# Patient Record
Sex: MALE | Race: WHITE | Employment: OTHER | URBAN - METROPOLITAN AREA
[De-identification: names, ages, dates, MRNs, and addresses within clinical notes are randomized per-mention and may not be internally consistent; named-entity substitution may affect disease eponyms.]

---

## 2021-07-02 ENCOUNTER — TRANSFERRED RECORDS (OUTPATIENT)
Dept: HEALTH INFORMATION MANAGEMENT | Facility: CLINIC | Age: 76
End: 2021-07-02

## 2021-07-03 ENCOUNTER — TRANSFERRED RECORDS (OUTPATIENT)
Dept: HEALTH INFORMATION MANAGEMENT | Facility: CLINIC | Age: 76
End: 2021-07-03

## 2021-07-03 LAB
ALT SERPL-CCNC: 27 IU/L (ref 0–44)
AST SERPL-CCNC: 27 IU/L (ref 0–40)
CHOLESTEROL (EXTERNAL): 154 MG/DL (ref 100–199)
CREATININE (EXTERNAL): 0.83 MG/DL (ref 0.76–1.27)
GFR ESTIMATED (EXTERNAL): 87 ML/MIN/1.73
GFR ESTIMATED (IF AFRICAN AMERICAN) (EXTERNAL): 100 ML/MIN/1.73
GLUCOSE (EXTERNAL): 108 MG/DL (ref 65–99)
HBA1C MFR BLD: 5.1 % (ref 4.8–5.6)
HDLC SERPL-MCNC: 42 MG/DL
LDL CHOLESTEROL CALCULATED (EXTERNAL): 92 MG/DL (ref 0–99)
POTASSIUM (EXTERNAL): 4.6 MMOL/L (ref 3.5–5.2)
TRIGLYCERIDES (EXTERNAL): 107 MG/DL (ref 0–149)

## 2021-07-06 ENCOUNTER — TRANSFERRED RECORDS (OUTPATIENT)
Dept: HEALTH INFORMATION MANAGEMENT | Facility: CLINIC | Age: 76
End: 2021-07-06

## 2021-07-08 ENCOUNTER — MEDICAL CORRESPONDENCE (OUTPATIENT)
Dept: HEALTH INFORMATION MANAGEMENT | Facility: CLINIC | Age: 76
End: 2021-07-08

## 2021-07-09 DIAGNOSIS — R01.1 HEART MURMUR: Primary | ICD-10-CM

## 2021-08-09 ENCOUNTER — HOSPITAL ENCOUNTER (OUTPATIENT)
Dept: CARDIOLOGY | Facility: CLINIC | Age: 76
Discharge: HOME | End: 2021-08-09
Attending: FAMILY MEDICINE | Admitting: FAMILY MEDICINE
Payer: COMMERCIAL

## 2021-08-09 DIAGNOSIS — R01.1 HEART MURMUR: Primary | ICD-10-CM

## 2021-08-09 LAB — LVEF ECHO: NORMAL

## 2021-08-09 PROCEDURE — 93306 TTE W/DOPPLER COMPLETE: CPT | Mod: 26 | Performed by: INTERNAL MEDICINE

## 2021-08-09 PROCEDURE — 255N000002 HC RX 255 OP 636: Performed by: FAMILY MEDICINE

## 2021-08-09 PROCEDURE — 999N000208 ECHOCARDIOGRAM COMPLETE

## 2021-08-09 RX ADMIN — HUMAN ALBUMIN MICROSPHERES AND PERFLUTREN 3 ML: 10; .22 INJECTION, SOLUTION INTRAVENOUS at 14:42

## 2021-08-18 DIAGNOSIS — I77.810 AORTIC ROOT DILATATION (H): Primary | ICD-10-CM

## 2021-10-05 ENCOUNTER — OFFICE VISIT (OUTPATIENT)
Dept: CARDIOLOGY | Facility: CLINIC | Age: 76
End: 2021-10-05
Payer: COMMERCIAL

## 2021-10-05 VITALS
WEIGHT: 220 LBS | SYSTOLIC BLOOD PRESSURE: 186 MMHG | HEART RATE: 83 BPM | BODY MASS INDEX: 31.5 KG/M2 | DIASTOLIC BLOOD PRESSURE: 86 MMHG | HEIGHT: 70 IN

## 2021-10-05 DIAGNOSIS — R01.1 HEART MURMUR: Primary | ICD-10-CM

## 2021-10-05 DIAGNOSIS — I77.810 AORTIC ROOT DILATATION (H): ICD-10-CM

## 2021-10-05 PROCEDURE — 93000 ELECTROCARDIOGRAM COMPLETE: CPT | Performed by: INTERNAL MEDICINE

## 2021-10-05 PROCEDURE — 99204 OFFICE O/P NEW MOD 45 MIN: CPT | Performed by: INTERNAL MEDICINE

## 2021-10-05 RX ORDER — ASPIRIN 81 MG/1
81 TABLET ORAL DAILY
COMMUNITY
End: 2022-12-19

## 2021-10-05 RX ORDER — METOPROLOL SUCCINATE 50 MG/1
50 TABLET, EXTENDED RELEASE ORAL DAILY
Qty: 30 TABLET | Refills: 11 | Status: SHIPPED | OUTPATIENT
Start: 2021-10-05 | End: 2022-09-09

## 2021-10-05 ASSESSMENT — MIFFLIN-ST. JEOR: SCORE: 1744.16

## 2021-10-05 NOTE — LETTER
10/5/2021    Manny Fajardo MD  [address]  [facility] [address]    RE: Francisco J BERNADETTE Vladimir       Dear Colleague,    I had the pleasure of seeing Francisco J Gilbert in the [facility].    HPI and Plan:       Francisco J Pate is a pleasant 76-year-old gentleman referred by Dr. Manny Gallego for abnormal echocardiogram.  He brings in sheet with his home blood pressures all ranging between 101 20s over 70s and 80s.  However he has whitecoat hypertension today obviously 186/86.  Because of this an echocardiogram was performed showing moderate aortic root and ascending aorta dilated measuring 4.5 and 4.4 cm respectively.  He also has mild aortic insufficiency.    He denies any chest pain chest pressure shortness of breath heart racing palpitations syncope near syncope any orthopnea pedal edema.  We reviewed the images of his echocardiogram.  I also reviewed his EKG which is essentially normal.    He has no family history of sudden cardiac death or aortic aneurysm.  He does not smoke.  He is nondiabetic.  He worked in [employer] for many years and believes this is what has caused stress and whitecoat hypertension.    My recommendations would be to check a lipid profile and consider starting him on statin therapy.  I would also recommend beta-blocker and have written a prescription for Toprol-XL 50 mg daily.  I would obtain a baseline CT scan with contrast to more accurately measure the size of the aorta and this will then thereby dictate frequency of follow-up.    I have measured the aorta myself.  And multiple coaxial cross images saved.  Measuring an average maximal diameter of 47.5cm.   No dissection or thrombosis.  Recommend repeat scan in 1 year.        Orders Placed This Encounter   Procedures     CT Chest Angio w/o & w Contrast     EKG 12-lead complete w/read - Clinics (performed today)     Orders Placed This Encounter   Medications     aspirin 81 MG EC tablet      Sig: Take 81 mg by mouth daily     metoprolol succinate ER (TOPROL-XL) 50 MG 24 hr tablet     Sig: Take 1 tablet (50 mg) by mouth daily     Dispense:  30 tablet     Refill:  11     Medications Discontinued During This Encounter   Medication Reason     ASPIRIN 325 MG OR TBEC Discontinued by another Health Care Provider         Encounter Diagnoses   Name Primary?     Aortic root dilatation (H)      Heart murmur Yes       CURRENT MEDICATIONS:  Current Outpatient Medications   Medication Sig Dispense Refill     aspirin 81 MG EC tablet Take 81 mg by mouth daily       FISH OIL 1000 MG OR CAPS 1 cap QD  0     LISINOPRIL PO Take 20 mg by mouth daily        metoprolol succinate ER (TOPROL-XL) 50 MG 24 hr tablet Take 1 tablet (50 mg) by mouth daily 30 tablet 11     MULTIVITAMINS OR TABS ONE DAILY 100 1 YEAR     FLUTICASONE PROPIONATE (NASAL) 50 MCG/ACT NA SUSP 2 Sprays in eache nostril daily (Patient not taking: Reported on 10/5/2021) 1 Month 12     FOLIC ACID 1 MG OR TABS ONE DAILY (Patient not taking: No sig reported) 3 MONTHS 1 YEAR       ALLERGIES     Allergies   Allergen Reactions     Sulfa Drugs        PAST MEDICAL HISTORY:  Past Medical History:   Diagnosis Date     Hypertension        PAST SURGICAL HISTORY:  Past Surgical History:   Procedure Laterality Date     COLONOSCOPY N/A 12/8/2015    Procedure: COLONOSCOPY;  Surgeon: Miles Banerjee MD;  Location:  GI     TONSILLECTOMY         FAMILY HISTORY:  History reviewed. No pertinent family history.    SOCIAL HISTORY:  Social History     Socioeconomic History     Marital status:      Spouse name: None     Number of children: None     Years of education: None     Highest education level: None   Occupational History     None   Tobacco Use     Smoking status: Never Smoker     Smokeless tobacco: Never Used   Substance and Sexual Activity     Alcohol use: Yes     Comment: bottle /week     Drug use: None     Sexual activity: None   Other Topics Concern      "Parent/sibling w/ CABG, MI or angioplasty before 65F 55M? Not Asked   Social History Narrative     None     Social Determinants of Health     Financial Resource Strain:      Difficulty of Paying Living Expenses:    Food Insecurity:      Worried About Running Out of Food in the Last Year:      Ran Out of Food in the Last Year:    Transportation Needs:      Lack of Transportation (Medical):      Lack of Transportation (Non-Medical):    Physical Activity:      Days of Exercise per Week:      Minutes of Exercise per Session:    Stress:      Feeling of Stress :    Social Connections:      Frequency of Communication with Friends and Family:      Frequency of Social Gatherings with Friends and Family:      Attends Confucianist Services:      Active Member of Clubs or Organizations:      Attends Club or Organization Meetings:      Marital Status:    Intimate Partner Violence:      Fear of Current or Ex-Partner:      Emotionally Abused:      Physically Abused:      Sexually Abused:        Review of Systems:  Skin:  Negative     Eyes:  Negative    ENT:  Negative    Respiratory:  Negative    Cardiovascular:  Negative for;palpitations;chest pain;lightheadedness    Gastroenterology: Negative    Genitourinary:  Negative    Musculoskeletal:  Negative    Neurologic:       Psychiatric:  Negative    Heme/Lymph/Imm:  Negative    Endocrine:  Negative            Physical Exam:  Vitals: BP (!) 186/86   Pulse 83   Ht 1.778 m (5' 10\")   Wt 99.8 kg (220 lb)   BMI 31.57 kg/m    Constitutional: cooperative, alert and oriented, well developed, well nourished, in no acute distress   Skin: warm and dry to the touch, no apparent skin lesions or masses noted   Head: normocephalic, no masses or lesions   Eyes: pupils equal and round, conjunctivae and lids unremarkable, sclera white, no xanthalasma, EOMS intact, no nystagmus   ENT: no pallor or cyanosis, dentition good   Neck: carotid pulses are full and equal bilaterally, JVP normal, no carotid " bruit, no thyromegaly   Chest: normal breath sounds, clear to auscultation, normal A-P diameter, normal symmetry, normal respiratory excursion, no use of accessory muscles   Cardiac: regular rhythm, normal S1/S2, no S3 or S4, apical impulse not displaced, no murmurs, gallops or rubs no presence of murmur   Abdomen: abdomen soft, non-tender, BS normoactive, no mass, no HSM, no bruits   Vascular: pulses full and equal, no bruits auscultated   Extremities and Back: no deformities, clubbing, cyanosis, erythema observed   Neurological: affect appropriate, oriented to time, person and place     Recent Lab Results:  LIPID RESULTS:  Lab Results   Component Value Date    CHOL 167 @ 07/01/2006    HDL 40 @ 07/01/2006    LDL 89 @ 07/01/2006    TRIG 191 @ 07/01/2006    CHOLHDLRATIO 3.9 04/11/2005       LIVER ENZYME RESULTS:  Lab Results   Component Value Date    AST 29 @ 07/01/2006    ALT 30 @ 07/01/2006       CBC RESULTS:  No results found for: WBC, RBC, HGB, HCT, MCV, MCH, MCHC, RDW, PLT    BMP RESULTS:  Lab Results   Component Value Date    POTASSIUM 4.6 12/29/2006     @ 07/01/2006    CR 0.90 12/29/2006    GFRESTIMATED >90 12/29/2006    GFRESTBLACK  12/29/2006     >90  Stages of Chronic Kidney Disease  Stage 1:  GFR 90 or greater and other evidence of kidney damage*  Stage 2:  GFR 60-89 and other evidence of kidney damage *  Stage 3:  GFR 30-59  Stage 4:  GFR 15-29  Stage 5:  GFR less than 15 or dialysis  *Chronic kidney disease is defined as kidney damage or GFR less than 60   mL/min/1.73 m2 for three months or greater.  Kidney damage is defined as   pathologic abnormalities or markers or damage, including abnormalities in blood   or urine tests or imaging studies.        A1C RESULTS:  Lab Results   Component Value Date    A1C 5.0 @ 06/24/2005       INR RESULTS:  No results found for: INR        CC  [facility]  [address]  [place] [address]                      Thank you  for allowing me to participate in the care of your patient.      Sincerely,     VA BLACK MD     [facility]  cc:   [facility]  [address]  [place]  [place] [address]

## 2021-10-05 NOTE — PROGRESS NOTES
HPI and Plan:       Francisco J Pate is a pleasant 76-year-old gentleman referred by Dr. Manny Gallego for abnormal echocardiogram.  He brings in sheet with his home blood pressures all ranging between 101 20s over 70s and 80s.  However he has whitecoat hypertension today obviously 186/86.  Because of this an echocardiogram was performed showing moderate aortic root and ascending aorta dilated measuring 4.5 and 4.4 cm respectively.  He also has mild aortic insufficiency.    He denies any chest pain chest pressure shortness of breath heart racing palpitations syncope near syncope any orthopnea pedal edema.  We reviewed the images of his echocardiogram.  I also reviewed his EKG which is essentially normal.    He has no family history of sudden cardiac death or aortic aneurysm.  He does not smoke.  He is nondiabetic.  He worked in [employer] for many years and believes this is what has caused stress and whitecoat hypertension.    My recommendations would be to check a lipid profile and consider starting him on statin therapy.  I would also recommend beta-blocker and have written a prescription for Toprol-XL 50 mg daily.  I would obtain a baseline CT scan with contrast to more accurately measure the size of the aorta and this will then thereby dictate frequency of follow-up.    I have measured the aorta myself.  And multiple coaxial cross images saved.  Measuring an average maximal diameter of 47.5cm.   No dissection or thrombosis.  Recommend repeat scan in 1 year.        Orders Placed This Encounter   Procedures     CT Chest Angio w/o & w Contrast     EKG 12-lead complete w/read - Clinics (performed today)     Orders Placed This Encounter   Medications     aspirin 81 MG EC tablet     Sig: Take 81 mg by mouth daily     metoprolol succinate ER (TOPROL-XL) 50 MG 24 hr tablet     Sig: Take 1 tablet (50 mg) by mouth daily     Dispense:  30 tablet     Refill:  11     Medications Discontinued During This Encounter   Medication Reason      ASPIRIN 325 MG OR TBEC Discontinued by another Health Care Provider         Encounter Diagnoses   Name Primary?     Aortic root dilatation (H)      Heart murmur Yes       CURRENT MEDICATIONS:  Current Outpatient Medications   Medication Sig Dispense Refill     aspirin 81 MG EC tablet Take 81 mg by mouth daily       FISH OIL 1000 MG OR CAPS 1 cap QD  0     LISINOPRIL PO Take 20 mg by mouth daily        metoprolol succinate ER (TOPROL-XL) 50 MG 24 hr tablet Take 1 tablet (50 mg) by mouth daily 30 tablet 11     MULTIVITAMINS OR TABS ONE DAILY 100 1 YEAR     FLUTICASONE PROPIONATE (NASAL) 50 MCG/ACT NA SUSP 2 Sprays in eache nostril daily (Patient not taking: Reported on 10/5/2021) 1 Month 12     FOLIC ACID 1 MG OR TABS ONE DAILY (Patient not taking: No sig reported) 3 MONTHS 1 YEAR       ALLERGIES     Allergies   Allergen Reactions     Sulfa Drugs        PAST MEDICAL HISTORY:  Past Medical History:   Diagnosis Date     Hypertension        PAST SURGICAL HISTORY:  Past Surgical History:   Procedure Laterality Date     COLONOSCOPY N/A 12/8/2015    Procedure: COLONOSCOPY;  Surgeon: Miles Banerjee MD;  Location:  GI     TONSILLECTOMY         FAMILY HISTORY:  History reviewed. No pertinent family history.    SOCIAL HISTORY:  Social History     Socioeconomic History     Marital status:      Spouse name: None     Number of children: None     Years of education: None     Highest education level: None   Occupational History     None   Tobacco Use     Smoking status: Never Smoker     Smokeless tobacco: Never Used   Substance and Sexual Activity     Alcohol use: Yes     Comment: bottle /week     Drug use: None     Sexual activity: None   Other Topics Concern     Parent/sibling w/ CABG, MI or angioplasty before 65F 55M? Not Asked   Social History Narrative     None     Social Determinants of Health     Financial Resource Strain:      Difficulty of Paying Living Expenses:    Food Insecurity:      Worried About  "Running Out of Food in the Last Year:      Ran Out of Food in the Last Year:    Transportation Needs:      Lack of Transportation (Medical):      Lack of Transportation (Non-Medical):    Physical Activity:      Days of Exercise per Week:      Minutes of Exercise per Session:    Stress:      Feeling of Stress :    Social Connections:      Frequency of Communication with Friends and Family:      Frequency of Social Gatherings with Friends and Family:      Attends Orthodox Services:      Active Member of Clubs or Organizations:      Attends Club or Organization Meetings:      Marital Status:    Intimate Partner Violence:      Fear of Current or Ex-Partner:      Emotionally Abused:      Physically Abused:      Sexually Abused:        Review of Systems:  Skin:  Negative     Eyes:  Negative    ENT:  Negative    Respiratory:  Negative    Cardiovascular:  Negative for;palpitations;chest pain;lightheadedness    Gastroenterology: Negative    Genitourinary:  Negative    Musculoskeletal:  Negative    Neurologic:       Psychiatric:  Negative    Heme/Lymph/Imm:  Negative    Endocrine:  Negative            Physical Exam:  Vitals: BP (!) 186/86   Pulse 83   Ht 1.778 m (5' 10\")   Wt 99.8 kg (220 lb)   BMI 31.57 kg/m    Constitutional: cooperative, alert and oriented, well developed, well nourished, in no acute distress   Skin: warm and dry to the touch, no apparent skin lesions or masses noted   Head: normocephalic, no masses or lesions   Eyes: pupils equal and round, conjunctivae and lids unremarkable, sclera white, no xanthalasma, EOMS intact, no nystagmus   ENT: no pallor or cyanosis, dentition good   Neck: carotid pulses are full and equal bilaterally, JVP normal, no carotid bruit, no thyromegaly   Chest: normal breath sounds, clear to auscultation, normal A-P diameter, normal symmetry, normal respiratory excursion, no use of accessory muscles   Cardiac: regular rhythm, normal S1/S2, no S3 or S4, apical impulse not " displaced, no murmurs, gallops or rubs no presence of murmur   Abdomen: abdomen soft, non-tender, BS normoactive, no mass, no HSM, no bruits   Vascular: pulses full and equal, no bruits auscultated   Extremities and Back: no deformities, clubbing, cyanosis, erythema observed   Neurological: affect appropriate, oriented to time, person and place     Recent Lab Results:  LIPID RESULTS:  Lab Results   Component Value Date    CHOL 167 @ 07/01/2006    HDL 40 @ 07/01/2006    LDL 89 @ 07/01/2006    TRIG 191 @ 07/01/2006    CHOLHDLRATIO 3.9 04/11/2005       LIVER ENZYME RESULTS:  Lab Results   Component Value Date    AST 29 @ 07/01/2006    ALT 30 @ 07/01/2006       CBC RESULTS:  No results found for: WBC, RBC, HGB, HCT, MCV, MCH, MCHC, RDW, PLT    BMP RESULTS:  Lab Results   Component Value Date    POTASSIUM 4.6 12/29/2006     @ 07/01/2006    CR 0.90 12/29/2006    GFRESTIMATED >90 12/29/2006    GFRESTBLACK  12/29/2006     >90  Stages of Chronic Kidney Disease  Stage 1:  GFR 90 or greater and other evidence of kidney damage*  Stage 2:  GFR 60-89 and other evidence of kidney damage *  Stage 3:  GFR 30-59  Stage 4:  GFR 15-29  Stage 5:  GFR less than 15 or dialysis  *Chronic kidney disease is defined as kidney damage or GFR less than 60   mL/min/1.73 m2 for three months or greater.  Kidney damage is defined as   pathologic abnormalities or markers or damage, including abnormalities in blood   or urine tests or imaging studies.        A1C RESULTS:  Lab Results   Component Value Date    A1C 5.0 @ 06/24/2005       INR RESULTS:  No results found for: INR        CC  [facility]  [address]  [place] [address]

## 2021-10-21 ENCOUNTER — HOSPITAL ENCOUNTER (OUTPATIENT)
Dept: CARDIOLOGY | Facility: CLINIC | Age: 76
Discharge: HOME | End: 2021-10-21
Attending: INTERNAL MEDICINE | Admitting: INTERNAL MEDICINE
Payer: COMMERCIAL

## 2021-10-21 DIAGNOSIS — I77.810 AORTIC ROOT DILATATION (H): ICD-10-CM

## 2021-10-21 DIAGNOSIS — R01.1 HEART MURMUR: ICD-10-CM

## 2021-10-21 LAB
CREAT BLD-MCNC: 0.9 MG/DL (ref 0.7–1.3)
GFR SERPL CREATININE-BSD FRML MDRD: >60 ML/MIN/1.73M2

## 2021-10-21 PROCEDURE — 82565 ASSAY OF CREATININE: CPT

## 2021-10-21 PROCEDURE — 250N000011 HC RX IP 250 OP 636: Performed by: INTERNAL MEDICINE

## 2021-10-21 PROCEDURE — 71275 CT ANGIOGRAPHY CHEST: CPT

## 2021-10-21 RX ORDER — METHYLPREDNISOLONE SODIUM SUCCINATE 125 MG/2ML
125 INJECTION, POWDER, LYOPHILIZED, FOR SOLUTION INTRAMUSCULAR; INTRAVENOUS
Status: DISCONTINUED | OUTPATIENT
Start: 2021-10-21 | End: 2021-10-22 | Stop reason: HOSPADM

## 2021-10-21 RX ORDER — IOPAMIDOL 755 MG/ML
500 INJECTION, SOLUTION INTRAVASCULAR ONCE
Status: COMPLETED | OUTPATIENT
Start: 2021-10-21 | End: 2021-10-21

## 2021-10-21 RX ORDER — DIPHENHYDRAMINE HYDROCHLORIDE 50 MG/ML
25-50 INJECTION INTRAMUSCULAR; INTRAVENOUS
Status: DISCONTINUED | OUTPATIENT
Start: 2021-10-21 | End: 2021-10-22 | Stop reason: HOSPADM

## 2021-10-21 RX ORDER — ONDANSETRON 2 MG/ML
4 INJECTION INTRAMUSCULAR; INTRAVENOUS
Status: DISCONTINUED | OUTPATIENT
Start: 2021-10-21 | End: 2021-10-22 | Stop reason: HOSPADM

## 2021-10-21 RX ORDER — DIPHENHYDRAMINE HCL 25 MG
25 CAPSULE ORAL
Status: DISCONTINUED | OUTPATIENT
Start: 2021-10-21 | End: 2021-10-22 | Stop reason: HOSPADM

## 2021-10-21 RX ORDER — ACYCLOVIR 200 MG/1
0-1 CAPSULE ORAL
Status: DISCONTINUED | OUTPATIENT
Start: 2021-10-21 | End: 2021-10-22 | Stop reason: HOSPADM

## 2021-10-21 RX ADMIN — IOPAMIDOL 100 ML: 755 INJECTION, SOLUTION INTRAVENOUS at 14:00

## 2021-11-19 ENCOUNTER — TELEPHONE (OUTPATIENT)
Dept: CARDIOLOGY | Facility: CLINIC | Age: 76
End: 2021-11-19
Payer: COMMERCIAL

## 2021-11-19 DIAGNOSIS — I77.810 AORTIC ROOT DILATATION (H): Primary | ICD-10-CM

## 2021-11-19 NOTE — TELEPHONE ENCOUNTER
Dr. Travis re - measured CT scan done 10/21/21.  He got an average measurement of 47.5 mm.  He would like a repeat CT scan in 1 year.  I called patient with this information.  I left a VM.

## 2022-07-27 ENCOUNTER — TRANSFERRED RECORDS (OUTPATIENT)
Dept: HEALTH INFORMATION MANAGEMENT | Facility: CLINIC | Age: 77
End: 2022-07-27

## 2022-08-10 ENCOUNTER — CARE COORDINATION (OUTPATIENT)
Dept: CARDIOLOGY | Facility: CLINIC | Age: 77
End: 2022-08-10

## 2022-08-10 NOTE — PROGRESS NOTES
Received CTA chest results from 10/2021 from [facility]. A note attached states that pt will see  in September as a new pt. Pt has some medical anxiety and is open to medication if the benefits are made clear to him. Pt likes concrete goals. Will add to chart prep note for 9/9/22. CTA from 10/2021 has already been scanned into [employer]. Govind ALMEIDA August 10, 2022, 2:58 PM

## 2022-09-09 ENCOUNTER — OFFICE VISIT (OUTPATIENT)
Dept: CARDIOLOGY | Facility: CLINIC | Age: 77
End: 2022-09-09
Payer: COMMERCIAL

## 2022-09-09 VITALS
HEART RATE: 64 BPM | WEIGHT: 217.9 LBS | BODY MASS INDEX: 31.2 KG/M2 | SYSTOLIC BLOOD PRESSURE: 123 MMHG | OXYGEN SATURATION: 97 % | DIASTOLIC BLOOD PRESSURE: 64 MMHG | HEIGHT: 70 IN

## 2022-09-09 DIAGNOSIS — I77.810 AORTIC ROOT DILATATION (H): ICD-10-CM

## 2022-09-09 DIAGNOSIS — I10 ESSENTIAL HYPERTENSION, BENIGN: Primary | ICD-10-CM

## 2022-09-09 DIAGNOSIS — I35.1 NONRHEUMATIC AORTIC VALVE INSUFFICIENCY: ICD-10-CM

## 2022-09-09 DIAGNOSIS — R01.1 HEART MURMUR: ICD-10-CM

## 2022-09-09 PROCEDURE — 99214 OFFICE O/P EST MOD 30 MIN: CPT | Performed by: INTERNAL MEDICINE

## 2022-09-09 RX ORDER — METOPROLOL SUCCINATE 50 MG/1
50 TABLET, EXTENDED RELEASE ORAL DAILY
Qty: 90 TABLET | Refills: 3 | Status: SHIPPED | OUTPATIENT
Start: 2022-09-09 | End: 2023-10-13

## 2022-09-09 RX ORDER — LISINOPRIL AND HYDROCHLOROTHIAZIDE 20; 25 MG/1; MG/1
1 TABLET ORAL DAILY
Qty: 90 TABLET | Refills: 3 | Status: SHIPPED | OUTPATIENT
Start: 2022-09-09 | End: 2022-09-26

## 2022-09-09 NOTE — PROGRESS NOTES
Service Date: 09/09/2022    CARDIOLOGY OFFICE PROGRESS NOTE    PRIMARY CARE PHYSICIAN:  Seymour Reardon, OTR/L       HISTORY OF PRESENT ILLNESS:  I had the opportunity to see Mr. Francisco J Gilbert in Cardiology Clinic today at [facility] in [place] for evaluation of an ascending aortic aneurysm.  Mr. Gilbert is a 77-year-old gentleman who was found to have a cardiac murmur a year ago and referred for an echocardiogram.  That echocardiogram revealed mild aortic valve sclerosis of a trileaflet valve with mild aortic regurgitation and no significant valvular stenosis.  He was also noted to have a dilated ascending aorta measured at 4.4 cm on the echocardiogram with moderate root dilation measured at 4.5 cm.  A followup CT scan of the ascending aorta was performed on 10/21/2021 demonstrating a maximum dimension of the aortic root of 4.7 cm and a maximum dimension of the ascending aorta at 4.9 cm.  He was started on metoprolol XL 50 mg a day in addition to his lisinopril/hydrochlorothiazide 20/25 mg p.o. daily and has done well with this.  He has had no concerning cardiac symptoms.  He walks more than 20 miles a week and has no concerning cardiac symptoms, such as chest discomfort, shortness of breath, lightheadedness, palpitations or syncope.    He does have hypertension, obviously, but his blood pressures at home have been extremely well controlled.  Typically, his blood pressures are in the 1-teens to low 120s systolic.  He never has blood pressures above 140 at home.  However, in the office, his blood pressures are more consistently elevated, sometimes up into the 160s and 180s.  His heart rates are generally in the 50s.  He does not have lightheadedness or syncope.    PHYSICAL EXAMINATION:  On examination here today, his blood pressure is 123/64, heart rate 64 and weight 217 pounds.  His blood pressure was 165/86 before coming down.  His lungs are clear.  His heart rhythm is regular.  He has a soft  systolic ejection murmur at the base.    IMPRESSIONS:  Mr. Francisco J Gilbert is a 77-year-old gentleman with mildly sclerotic trileaflet aortic valve with mild aortic regurgitation.  He also has moderate dilation of the aortic root and ascending aorta.  I reassured him that this does not require surgical treatment and that we can continue monitoring his ascending aorta at this time. We may consider repair of the ascending aorta if it reaches 5.5-6 cm.    His blood pressure control appears optimal.  I will not change any medications.  I will have him repeat an echocardiogram this year to recheck his aortic root and ascending aorta and consider a CT angiogram if his aorta appears larger in size.    cc:  Seymour Reardon, OTR/L  [facility] [address] [place] [address]    Shashank Wilson MD, [facility]        D: 2022   T: 2022   MT: [email or web address]    Name:     FRANCISCO J GILBERT  MRN:      [id number]        Account:      [id number]   :      1945           Service Date: 2022       Document: [id number]

## 2022-09-09 NOTE — PROGRESS NOTES
HPI and Plan:   See dictation    Today's clinic visit entailed:  Review of the result(s) of each unique test - echo, CTA aorta, flp  Ordering of each unique test  Prescription drug management  40 minutes spent on the date of the encounter doing chart review, review of outside records, review of test results, patient visit and documentation   Provider  Link to [facility] Help Grid     The level of medical decision making during this visit was of high complexity.      Orders Placed This Encounter   Procedures     Follow-Up with Cardiology     Echocardiogram Complete     Echocardiogram Complete       Orders Placed This Encounter   Medications     metoprolol succinate ER (TOPROL XL) 50 MG 24 hr tablet     Sig: Take 1 tablet (50 mg) by mouth daily     Dispense:  90 tablet     Refill:  3     lisinopril-hydrochlorothiazide (ZESTORETIC) 20-25 MG tablet     Sig: Take 1 tablet by mouth daily     Dispense:  90 tablet     Refill:  3       Medications Discontinued During This Encounter   Medication Reason     LISINOPRIL PO      metoprolol succinate ER (TOPROL-XL) 50 MG 24 hr tablet Reorder     FOLIC ACID 1 MG OR TABS          Encounter Diagnoses   Name Primary?     Aortic root dilatation (H)      Heart murmur      HYPERTENSION BENIGN Yes     Nonrheumatic aortic valve insufficiency        CURRENT MEDICATIONS:  Current Outpatient Medications   Medication Sig Dispense Refill     aspirin 81 MG EC tablet Take 81 mg by mouth daily       FISH OIL 1000 MG OR CAPS 1 cap QD  0     FLUTICASONE PROPIONATE (NASAL) 50 MCG/ACT NA SUSP 2 Sprays in eache nostril daily (Patient taking differently: As needed) 1 Month 12     lisinopril-hydrochlorothiazide (ZESTORETIC) 20-25 MG tablet Take 1 tablet by mouth daily 90 tablet 3     metoprolol succinate ER (TOPROL XL) 50 MG 24 hr tablet Take 1 tablet (50 mg) by mouth daily 90 tablet 3     MULTIVITAMINS OR TABS ONE DAILY 100 1 YEAR       ALLERGIES     Allergies   Allergen Reactions     Sulfa Drugs       "Seasonal Allergies        PAST MEDICAL HISTORY:  Past Medical History:   Diagnosis Date     Hypertension        PAST SURGICAL HISTORY:  Past Surgical History:   Procedure Laterality Date     COLONOSCOPY N/A 12/8/2015    Procedure: COLONOSCOPY;  Surgeon: Miles Banerjee MD;  Location:  GI     TONSILLECTOMY         FAMILY HISTORY:  No family history on file.    SOCIAL HISTORY:  Social History     Socioeconomic History     Marital status:      Spouse name: None     Number of children: None     Years of education: None     Highest education level: None   Tobacco Use     Smoking status: Never Smoker     Smokeless tobacco: Never Used   Substance and Sexual Activity     Alcohol use: Yes     Comment: wine every day     Drug use: Never       Review of Systems:  Skin:  Negative       Eyes:  Negative      ENT:  Negative      Respiratory:  Negative shortness of breath;dyspnea on exertion     Cardiovascular:  Negative;palpitations;chest pain;edema;fatigue;dizziness;lightheadedness      Gastroenterology: Negative      Genitourinary:  Negative      Musculoskeletal:  Negative      Neurologic:  Negative      Psychiatric:  Negative      Heme/Lymph/Imm:  Positive for allergies    Endocrine:  Negative        Physical Exam:  Vitals: /64   Pulse 64   Ht 1.778 m (5' 10\")   Wt 98.8 kg (217 lb 14.4 oz)   SpO2 97%   BMI 31.27 kg/m      Constitutional:           Skin:             Head:           Eyes:           Lymph:      ENT:           Neck:           Respiratory:            Cardiac:                                                           GI:           Extremities and Muscular Skeletal:                 Neurological:           Psych:           CC  Referred Self, MD  No address on file              "

## 2022-09-09 NOTE — PATIENT INSTRUCTIONS
It was a pleasure seeing you today and thank you for allowing me to be a part of your health care team.  Should you have any questions regarding your visit or future needs please feel free to reach out to my care team for assistance.      Thank you, Dr. Shashank Wilson        **Nursing: [phone]       **Scheduling: [phone]    
Pt is refusing to Exam B/L Lower EXT,Pt states DO NOT Touch My Foot & Dressings AT ALL  D/W Pt at detail, pt refusing dressing to be opened.

## 2022-09-09 NOTE — LETTER
9/9/2022    Seymour Reardon MD  [address]  [facility] [address]    RE: Francisco J Gilbert       Dear Colleague,     I had the pleasure of seeing Francisco J Gilbert in the [facility].  HPI and Plan:   See dictation    Today's clinic visit entailed:  Review of the result(s) of each unique test - echo, CTA aorta, flp  Ordering of each unique test  Prescription drug management  40 minutes spent on the date of the encounter doing chart review, review of outside records, review of test results, patient visit and documentation   Provider  Link to MDM Help Grid     The level of medical decision making during this visit was of high complexity.      Orders Placed This Encounter   Procedures     Follow-Up with Cardiology     Echocardiogram Complete     Echocardiogram Complete       Orders Placed This Encounter   Medications     metoprolol succinate ER (TOPROL XL) 50 MG 24 hr tablet     Sig: Take 1 tablet (50 mg) by mouth daily     Dispense:  90 tablet     Refill:  3     lisinopril-hydrochlorothiazide (ZESTORETIC) 20-25 MG tablet     Sig: Take 1 tablet by mouth daily     Dispense:  90 tablet     Refill:  3       Medications Discontinued During This Encounter   Medication Reason     LISINOPRIL PO      metoprolol succinate ER (TOPROL-XL) 50 MG 24 hr tablet Reorder     FOLIC ACID 1 MG OR TABS          Encounter Diagnoses   Name Primary?     Aortic root dilatation (H)      Heart murmur      HYPERTENSION BENIGN Yes     Nonrheumatic aortic valve insufficiency        CURRENT MEDICATIONS:  Current Outpatient Medications   Medication Sig Dispense Refill     aspirin 81 MG EC tablet Take 81 mg by mouth daily       FISH OIL 1000 MG OR CAPS 1 cap QD  0     FLUTICASONE PROPIONATE (NASAL) 50 MCG/ACT NA SUSP 2 Sprays in eache nostril daily (Patient taking differently: As needed) 1 Month 12     lisinopril-hydrochlorothiazide (ZESTORETIC) 20-25 MG tablet Take 1 tablet by mouth daily 90 tablet 3     metoprolol  "succinate ER (TOPROL XL) 50 MG 24 hr tablet Take 1 tablet (50 mg) by mouth daily 90 tablet 3     MULTIVITAMINS OR TABS ONE DAILY 100 1 YEAR       ALLERGIES     Allergies   Allergen Reactions     Sulfa Drugs      Seasonal Allergies        PAST MEDICAL HISTORY:  Past Medical History:   Diagnosis Date     Hypertension        PAST SURGICAL HISTORY:  Past Surgical History:   Procedure Laterality Date     COLONOSCOPY N/A 12/8/2015    Procedure: COLONOSCOPY;  Surgeon: Miles Banerjee MD;  Location:  GI     TONSILLECTOMY         FAMILY HISTORY:  No family history on file.    SOCIAL HISTORY:  Social History     Socioeconomic History     Marital status:      Spouse name: None     Number of children: None     Years of education: None     Highest education level: None   Tobacco Use     Smoking status: Never Smoker     Smokeless tobacco: Never Used   Substance and Sexual Activity     Alcohol use: Yes     Comment: wine every day     Drug use: Never       Review of Systems:  Skin:  Negative       Eyes:  Negative      ENT:  Negative      Respiratory:  Negative shortness of breath;dyspnea on exertion     Cardiovascular:  Negative;palpitations;chest pain;edema;fatigue;dizziness;lightheadedness      Gastroenterology: Negative      Genitourinary:  Negative      Musculoskeletal:  Negative      Neurologic:  Negative      Psychiatric:  Negative      Heme/Lymph/Imm:  Positive for allergies    Endocrine:  Negative        Physical Exam:  Vitals: /64   Pulse 64   Ht 1.778 m (5' 10\")   Wt 98.8 kg (217 lb 14.4 oz)   SpO2 97%   BMI 31.27 kg/m      Constitutional:           Skin:             Head:           Eyes:           Lymph:      ENT:           Neck:           Respiratory:            Cardiac:                                                           GI:           Extremities and Muscular Skeletal:                 Neurological:           Psych:           CC  Referred Self    Thank you for allowing me to participate " in the care of your patient.      Sincerely,     ASHLEIGH LANDRY MD     [facility]

## 2022-09-12 ENCOUNTER — TELEPHONE (OUTPATIENT)
Dept: CARDIOLOGY | Facility: CLINIC | Age: 77
End: 2022-09-12

## 2022-09-12 DIAGNOSIS — R01.1 HEART MURMUR: ICD-10-CM

## 2022-09-12 DIAGNOSIS — I77.810 AORTIC ROOT DILATATION (H): ICD-10-CM

## 2022-09-12 DIAGNOSIS — I10 ESSENTIAL HYPERTENSION, BENIGN: Primary | ICD-10-CM

## 2022-09-12 NOTE — TELEPHONE ENCOUNTER
I called pt and discussed his concerns. Pt said he already takes lisinopril and metoprolol.  changed his lisinopril to a combo med of lisinopril/HCTZ. Pt has a sulfa allergy and wants to make sure its ok to take the hydrochlorothiazide. I will message  and call pt back with an update. Govind ALMEIDA September 12, 2022, 3:26 PM

## 2022-09-12 NOTE — TELEPHONE ENCOUNTER
[facility] Call Center    Phone Message    May a detailed message be left on voicemail: yes     Reason for Call: Other: PtFrancisco J has a Sulfa Allergy and concerned with the prescriptions: lisinopril-hydrochlorothiazide (ZESTORETIC) 20-25 MG tablet & metoprolol succinate ER (TOPROL XL) 50 MG 24 hr tablet.  Please call Francisco J at: [phone]     Action Taken: Other: [facility]    Travel Screening: Not Applicable

## 2022-09-22 NOTE — TELEPHONE ENCOUNTER
We should make sure that he did not have anaphylaxis with sulfa, otherwise the risk of a severe allergic reaction with hydrochlorothiazide is low and he can start it and let us know if he has problems. EE

## 2022-09-23 NOTE — TELEPHONE ENCOUNTER
I called pt to verify whether he had an anaphylactic reaction to sulfa in the past. Pt said he was 9 or 10 years old so he doesn't remember the event clearly. He remembers breaking out in hives, and he has had sulfa documented as an allergy since that time. He can't remember if he had throat tightening or shortness of breath. Pt said he has been taking lisinopril 20 mg daily and metoprolol succinate 50 mg daily and his BP has ranged from 107//69, HR 50s since taking both of those medications. I will route an update to . I told pt to continue to monitor his BPs at home and call if SBP is consistently in the 130s. Govind ALMEIDA September 23, 2022, 11:32 AM

## 2022-09-23 NOTE — TELEPHONE ENCOUNTER
Maybe he doesn't need the HcTZ, since his BP now seems well controlled. Ok to continue lisinopril and metoprolol and we will hold off on hydrochlorothiazide for now and continue monitoring his BP. EE

## 2022-09-26 RX ORDER — LISINOPRIL 20 MG/1
20 TABLET ORAL DAILY
COMMUNITY
Start: 2022-09-26 | End: 2022-09-28

## 2022-09-26 NOTE — TELEPHONE ENCOUNTER
I left message for pt to call back to discuss 's recommendations. Pt to continue taking lisinopril 20 mg daily and metoprolol succinate 50 mg daily. He doesn't need to take the hydrochlorothiazide/lisinopril combination. Pt to continue monitoring home BP and to call if it's consistently elevated. Medication list updated. Will see if pt needs refills when he calls back. He already has a lisinopril rx from his PCP. Govind ALMEIDA September 26, 2022, 10:30 AM

## 2022-09-28 RX ORDER — LISINOPRIL 20 MG/1
20 TABLET ORAL DAILY
Qty: 90 TABLET | Refills: 3 | Status: SHIPPED | OUTPATIENT
Start: 2022-09-28 | End: 2023-06-02

## 2022-09-28 NOTE — TELEPHONE ENCOUNTER
I called pt with recommendation from  to discontinue the hydrochlorothiazide and continue taking lisinopril 20 mg daily and metoprolol succinate 50 mg daily. Pt to call if BP trends up at home. Updated prescription sent to his pharmacy. Govind ALMEIDA September 28, 2022, 11:28 AM

## 2022-10-07 ENCOUNTER — CARE COORDINATION (OUTPATIENT)
Dept: CARDIOLOGY | Facility: CLINIC | Age: 77
End: 2022-10-07

## 2022-10-07 ENCOUNTER — HOSPITAL ENCOUNTER (OUTPATIENT)
Dept: CARDIOLOGY | Facility: CLINIC | Age: 77
Discharge: HOME | End: 2022-10-07
Attending: INTERNAL MEDICINE | Admitting: INTERNAL MEDICINE
Payer: COMMERCIAL

## 2022-10-07 DIAGNOSIS — I77.810 AORTIC ROOT DILATATION (H): ICD-10-CM

## 2022-10-07 LAB — LVEF ECHO: NORMAL

## 2022-10-07 PROCEDURE — 93306 TTE W/DOPPLER COMPLETE: CPT

## 2022-10-07 PROCEDURE — 93306 TTE W/DOPPLER COMPLETE: CPT | Mod: 26 | Performed by: INTERNAL MEDICINE

## 2022-10-07 NOTE — PROGRESS NOTES
Kevin Armstrong, ELLE   10/7/2022  4:07 PM CDT Back to Top        Echo ordered post Dr Landry visit 2022 - plan: I will have him repeat an echocardiogram this year to recheck his aortic root and ascending aorta and consider a CT angiogram if his aorta appears larger in size.  Forwarded results to DR Landry and his nurse team.   [id number]  [id number]  [id number]  [id number]^FRANTZ^ASHLEIGH^MALINDA     [facility]  [facility]  Echocardiography Laboratory  [address]  [address]  [place] [address]  Phone [phone]  Fax [phone]     Name: YOEL AN  MRN: [id number]  : 1945  Study Date: 10/07/2022 01:58 PM  Age: 77 yrs  Gender: Male  Patient Location: [facility]  Reason For Study: Aortic root dilatation (H)  Ordering Physician: ASHLEIGH LANDRY  Referring Physician: Seymour Reardon  Performed By: Amarilis Wong     BSA: 2.2 m2  Height: 70 in  Weight: 217 lb  HR: 60  BP: 176/77 mmHg  ______________________________________________________________________________  Procedure  Complete Echo Adult.     Interpretation Summary     1. Left ventricular systolic function is normal. The visual ejection fraction  is 60-65%.  2. No regional wall motion abnormalities noted.  3. The right ventricle is normal in structure, function and size.  4. There is mild to moderate (1-2+) aortic regurgitation.  5. Moderately dilated aortic root (sinus of valsalva) 4.5 cm and moderately  dilated ascending aorta, 4.4 cm.  6. In comparison with the prior study, there has been no significant change.  ______________________________________________________________________________  Left Ventricle  The left ventricle is normal in size. There is normal left ventricular wall  thickness. Left ventricular systolic function is normal. The visual ejection  fraction is 60-65%. Diastolic Doppler findings (E/E' ratio and/or other  parameters) suggest left ventricular filling pressures are indeterminate.  No  regional wall motion abnormalities noted.     Right Ventricle  The right ventricle is normal in structure, function and size.     Atria  Normal left atrial size. Right atrial size is normal. There is no color  Doppler evidence of an atrial shunt.     Mitral Valve  The mitral valve is normal in structure and function.     Tricuspid Valve  The tricuspid valve is normal in structure and function. There is trace  tricuspid regurgitation.     Aortic Valve  The aortic valve is trileaflet with aortic valve sclerosis. There is mild to  moderate (1-2+) aortic regurgitation.     Pulmonic Valve  The pulmonic valve is not well seen, but is grossly normal.     Vessels  Moderate aortic root dilatation. The ascending aorta is Mildly dilated. IVC  diameter <2.1 cm collapsing >50% with sniff suggests a normal RA pressure of 3  mmHg.     Pericardium  There is no pericardial effusion.     Rhythm  Sinus rhythm was noted.     ______________________________________________________________________________  MMode/2D Measurements & Calculations  IVSd: 1.3 cm  LVIDd: 4.9 cm  LVIDs: 2.3 cm  LVPWd: 0.99 cm  FS: 53.1 %  LV mass(C)d: 215.4 grams  LV mass(C)dI: 99.7 grams/m2  Ao root diam: 4.5 cm  LA dimension: 3.1 cm     asc Aorta Diam: 4.4 cm  LA/Ao: 0.70  LVOT diam: 2.7 cm  LVOT area: 5.8 cm2  LA Volume (BP): 56.4 ml  LA Volume Index (BP): 26.1 ml/m2  RWT: 0.41     Doppler Measurements & Calculations  MV E max alecia: 102.0 cm/sec  MV A max alecia: 75.7 cm/sec  MV E/A: 1.3  MV dec slope: 394.4 cm/sec2  AI P1/2t: 422.3 msec     LV V1 max P.4 mmHg  LV V1 max: 135.9 cm/sec  LV V1 VTI: 32.7 cm  SV(LVOT): 188.4 ml  SI(LVOT): 87.2 ml/m2  PA acc time: 0.17 sec  E/E' av.1  Lateral E/e': 9.8  Medial E/e': 14.4     ______________________________________________________________________________  Report approved by: Salena Zamudio 10/07/2022 03:08 PM

## 2022-10-10 NOTE — PROGRESS NOTES
Echo was sent to  to review. It looks like  placed a CT chest angio order at the 10/5/21 visit, and scheduling mailed pt a letter with a request to call to schedule it. Will clarify with  whether this CT scan is still needed. Govind ALMEIDA October 10, 2022, 10:46 AM

## 2022-10-24 NOTE — TELEPHONE ENCOUNTER
There is no need for him to do a CT angiogram of the aorta at this time.  The echocardiogram looks stable.  The ascending aorta is mild to moderately dilated and we can evaluate this again next year by echocardiogram and reconsider CT angio if we see any significant change in the size of the aorta by echo.  Shashank Wilson MD

## 2022-10-25 NOTE — PROGRESS NOTES
I left message for pt to call back to review echo results. CT orders canceled. Govind ALMEIDA October 25, 2022, 12:01 PM

## 2022-11-03 NOTE — PROGRESS NOTES
Pt left message returning my call. I left message for pt to call back. Govind ALMEIDA November 3, 2022, 1:55 PM

## 2022-11-03 NOTE — PROGRESS NOTES
Pt called back and I reviewed echo result with him. Pt to have another echo and follow up in 9/2023. I let him know he does not need the CT scan at this time, but he may get a letter telling him he needs to schedule it. Pt to ignore letter if he does as it was based on an old order. Pt requested a copy of his echo report be mailed out to him. Govind ALMEIDA November 3, 2022, 2:00 PM

## 2022-12-16 ENCOUNTER — CARE COORDINATION (OUTPATIENT)
Dept: CARDIOLOGY | Facility: CLINIC | Age: 77
End: 2022-12-16

## 2022-12-16 NOTE — PROGRESS NOTES
Pt left message stating that he is having a reaction with one of his BP medications. I left message for pt to call back to discuss. Govind ALMEIDA December 16, 2022, 11:44 AM

## 2022-12-16 NOTE — PROGRESS NOTES
Pt called back. He said he is experiencing intermittent nose bleeds, increased fatigue, and cold hands/feet. He researched metoprolol and thinks some of the side effects are from that. I told pt the nosebleeds are likely not from metoprolol, and if they continue he should go to an ENT. Pt said he wants to know if  would be ok with him stopping aspirin. Pt said fatigue is the most noticeable side effect from metoprolol. I recommended he try taking it at night to see if that helps. Pt said his BP has been great at home and has been 110-115/55-65. Govind ALMEIDA December 16, 2022, 3:35 PM

## 2022-12-19 NOTE — PROGRESS NOTES
Steve, hSashank Weller MD  You 3 days ago     EE  Metoprolol is an important medication to keep his aorta from enlarging further. If he can take it at night and improve the fatigue issue, I would encourage that. He probably does not need the aspirin. He has no history of artery blockages. He can stop it. EE      I left message for pt with recommendations from . Pt to call back with any questions or if he has continued side effects with metoprolol after trying to take it at night. Ok to stop aspirin. Govind AMLEIDA December 19, 2022, 10:22 AM

## 2023-05-17 ENCOUNTER — APPOINTMENT (OUTPATIENT)
Dept: URBAN - METROPOLITAN AREA CLINIC 256 | Age: 78
Setting detail: DERMATOLOGY
End: 2023-05-17

## 2023-05-17 VITALS — WEIGHT: 209 LBS | HEIGHT: 70 IN

## 2023-05-17 DIAGNOSIS — D18.0 HEMANGIOMA: ICD-10-CM

## 2023-05-17 DIAGNOSIS — L72.0 EPIDERMAL CYST: ICD-10-CM

## 2023-05-17 DIAGNOSIS — Z71.89 OTHER SPECIFIED COUNSELING: ICD-10-CM

## 2023-05-17 DIAGNOSIS — L82.1 OTHER SEBORRHEIC KERATOSIS: ICD-10-CM

## 2023-05-17 DIAGNOSIS — D22 MELANOCYTIC NEVI: ICD-10-CM

## 2023-05-17 DIAGNOSIS — L57.8 OTHER SKIN CHANGES DUE TO CHRONIC EXPOSURE TO NONIONIZING RADIATION: ICD-10-CM

## 2023-05-17 DIAGNOSIS — L57.0 ACTINIC KERATOSIS: ICD-10-CM

## 2023-05-17 PROBLEM — D18.01 HEMANGIOMA OF SKIN AND SUBCUTANEOUS TISSUE: Status: ACTIVE | Noted: 2023-05-17

## 2023-05-17 PROBLEM — D22.5 MELANOCYTIC NEVI OF TRUNK: Status: ACTIVE | Noted: 2023-05-17

## 2023-05-17 PROCEDURE — OTHER LIQUID NITROGEN: OTHER

## 2023-05-17 PROCEDURE — OTHER COUNSELING: OTHER

## 2023-05-17 PROCEDURE — 17000 DESTRUCT PREMALG LESION: CPT

## 2023-05-17 PROCEDURE — 17003 DESTRUCT PREMALG LES 2-14: CPT

## 2023-05-17 PROCEDURE — 99213 OFFICE O/P EST LOW 20 MIN: CPT | Mod: 25

## 2023-05-17 PROCEDURE — OTHER MIPS QUALITY: OTHER

## 2023-05-17 ASSESSMENT — LOCATION SIMPLE DESCRIPTION DERM
LOCATION SIMPLE: LEFT FOREARM
LOCATION SIMPLE: RIGHT FOREHEAD
LOCATION SIMPLE: RIGHT POSTERIOR THIGH
LOCATION SIMPLE: RIGHT FOREARM
LOCATION SIMPLE: LEFT CHEEK
LOCATION SIMPLE: SCALP
LOCATION SIMPLE: LEFT UPPER BACK
LOCATION SIMPLE: LEFT POSTERIOR THIGH

## 2023-05-17 ASSESSMENT — LOCATION DETAILED DESCRIPTION DERM
LOCATION DETAILED: LEFT PROXIMAL DORSAL FOREARM
LOCATION DETAILED: RIGHT SUPERIOR FOREHEAD
LOCATION DETAILED: RIGHT CENTRAL PARIETAL SCALP
LOCATION DETAILED: LEFT SUPERIOR UPPER BACK
LOCATION DETAILED: LEFT MEDIAL UPPER BACK
LOCATION DETAILED: RIGHT DISTAL POSTERIOR THIGH
LOCATION DETAILED: RIGHT SUPERIOR PARIETAL SCALP
LOCATION DETAILED: RIGHT SUPERIOR MEDIAL FOREHEAD
LOCATION DETAILED: LEFT INFERIOR CENTRAL MALAR CHEEK
LOCATION DETAILED: RIGHT DISTAL DORSAL FOREARM
LOCATION DETAILED: LEFT INFERIOR UPPER BACK
LOCATION DETAILED: LEFT DISTAL POSTERIOR THIGH
LOCATION DETAILED: RIGHT INFERIOR LATERAL FOREHEAD

## 2023-05-17 ASSESSMENT — LOCATION ZONE DERM
LOCATION ZONE: SCALP
LOCATION ZONE: LEG
LOCATION ZONE: ARM
LOCATION ZONE: FACE
LOCATION ZONE: TRUNK

## 2023-05-17 NOTE — PROCEDURE: LIQUID NITROGEN
Show Applicator Variable?: Yes
Render Post-Care Instructions In Note?: no
Post-Care Instructions: I reviewed with the patient in detail post-care instructions. Patient is to wear sunprotection, and avoid picking at any of the treated lesions. Pt may apply Vaseline to crusted or scabbing areas.
Consent: The patient's consent was obtained including but not limited to risks of crusting, scabbing, blistering, scarring, darker or lighter pigmentary change, recurrence, incomplete removal and infection.
Duration Of Freeze Thaw-Cycle (Seconds): 5
Application Tool (Optional): Liquid Nitrogen Sprayer
Number Of Freeze-Thaw Cycles: 1 freeze-thaw cycle
Detail Level: Detailed

## 2023-06-02 DIAGNOSIS — I10 ESSENTIAL HYPERTENSION, BENIGN: ICD-10-CM

## 2023-06-02 RX ORDER — LISINOPRIL 20 MG/1
20 TABLET ORAL DAILY
Qty: 90 TABLET | Refills: 0 | Status: SHIPPED | OUTPATIENT
Start: 2023-06-02 | End: 2023-11-01

## 2023-08-24 ENCOUNTER — TRANSFERRED RECORDS (OUTPATIENT)
Dept: HEALTH INFORMATION MANAGEMENT | Facility: CLINIC | Age: 78
End: 2023-08-24

## 2023-08-29 ENCOUNTER — TRANSFERRED RECORDS (OUTPATIENT)
Dept: HEALTH INFORMATION MANAGEMENT | Facility: CLINIC | Age: 78
End: 2023-08-29

## 2023-08-29 ENCOUNTER — CARE COORDINATION (OUTPATIENT)
Dept: CARDIOLOGY | Facility: CLINIC | Age: 78
End: 2023-08-29

## 2023-08-29 ENCOUNTER — HOSPITAL ENCOUNTER (OUTPATIENT)
Dept: CARDIOLOGY | Facility: CLINIC | Age: 78
Discharge: HOME | End: 2023-08-29
Attending: INTERNAL MEDICINE | Admitting: INTERNAL MEDICINE
Payer: COMMERCIAL

## 2023-08-29 DIAGNOSIS — I77.810 AORTIC ROOT DILATATION (H): Primary | ICD-10-CM

## 2023-08-29 LAB — LVEF ECHO: NORMAL

## 2023-08-29 PROCEDURE — 999N000208 ECHOCARDIOGRAM COMPLETE

## 2023-08-29 PROCEDURE — 93306 TTE W/DOPPLER COMPLETE: CPT | Mod: 26 | Performed by: INTERNAL MEDICINE

## 2023-08-29 PROCEDURE — 255N000002 HC RX 255 OP 636: Performed by: INTERNAL MEDICINE

## 2023-08-29 RX ADMIN — HUMAN ALBUMIN MICROSPHERES AND PERFLUTREN 9 ML: 10; .22 INJECTION, SOLUTION INTRAVENOUS at 14:10

## 2023-08-29 NOTE — PROGRESS NOTES
Call pt to review results and check up on home BP readings in light of HTN on echo report and ascending aorta dilatation, AV regurgitation.     Pt scheduled for annual w/Dr. Wilson on 11/1/23.    No answer. Left Vm for pt w/reason for calling to review recent test results and for home BP/HR readings as well as any symptoms he may be having. Asked pt call back to discuss. Sent Echo report for Dr. Wilson's review in the mean time. Paula Delacruz RN on 8/29/2023 at 4:39 PM        Echocardiogram Complete   BSA: 2.2 m2  Height: 70 in  Weight: 217 lb  HR: 72  BP: 166/80 mmHg  ______________________________________________________________________________  Procedure  Complete Echo Adult. Optison (NDC #[id number]) given intravenously.     ______________________________________________________________________________  Interpretation Summary     BP: 166/80 mmHg.  Normal left ventricular thickness.  Left ventricular systolic function is normal.  The visual ejection fraction is 65-70 %.  Grade 1 left ventricular diastolic function.  No regional wall motion abnormalities.  Normal right ventricular size and systolic function.  Moderate aortic valve regurgitation, no significant stenosis. Mean systolic  gradient 11 mmHg.  A bicuspid aortic valve cannot be excluded.  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  Normal inferior vena cava.     On previous study dated 10/1/2022, aortic root reported as 4.5 cm, ascending  aorta as 4.4 cm. aortic valve regurgitation mild to moderate.

## 2023-08-30 NOTE — PROGRESS NOTES
8/30/23 Called to patient. Donis reports home blood pressures are consistently less than 120/60 and he was surprised when told blood pressure at echocardiogram visit was high. Patient reports will be seeing Dr Reardon his Primary Care provider tomorrow and will be comparing blood pressures and having his home machine check with the office machine for accuracy.     Patient reports is feeling better and is getting his digestive problems sorted out. He will be leaving for his [facility] home in the next week for 30 days.   Isabel Brown RN 08/30/23 2:21 PM

## 2023-09-11 ENCOUNTER — TRANSFERRED RECORDS (OUTPATIENT)
Dept: HEALTH INFORMATION MANAGEMENT | Facility: CLINIC | Age: 78
End: 2023-09-11

## 2023-09-18 NOTE — TELEPHONE ENCOUNTER
Echo reviewed and looks unchanged since last year.  The aortic valve may be bicuspid causing moderate regurgitation without stenosis.  Moderate dilation of the ascending aorta is stable and may also be a result of a bicuspid aortic valve.  No change in treatment.  Repeat echocardiogram in 1 year.  Shashank Wilson MD

## 2023-10-13 DIAGNOSIS — I77.810 AORTIC ROOT DILATATION (H): ICD-10-CM

## 2023-10-13 DIAGNOSIS — R01.1 HEART MURMUR: ICD-10-CM

## 2023-10-13 RX ORDER — METOPROLOL SUCCINATE 50 MG/1
50 TABLET, EXTENDED RELEASE ORAL DAILY
Qty: 90 TABLET | Refills: 0 | Status: SHIPPED | OUTPATIENT
Start: 2023-10-13 | End: 2023-11-01

## 2023-10-29 ENCOUNTER — HEALTH MAINTENANCE LETTER (OUTPATIENT)
Age: 78
End: 2023-10-29

## 2023-11-01 ENCOUNTER — OFFICE VISIT (OUTPATIENT)
Dept: CARDIOLOGY | Facility: CLINIC | Age: 78
End: 2023-11-01
Attending: INTERNAL MEDICINE
Payer: COMMERCIAL

## 2023-11-01 VITALS
HEIGHT: 70 IN | BODY MASS INDEX: 29.73 KG/M2 | WEIGHT: 207.7 LBS | HEART RATE: 63 BPM | SYSTOLIC BLOOD PRESSURE: 114 MMHG | DIASTOLIC BLOOD PRESSURE: 64 MMHG

## 2023-11-01 DIAGNOSIS — I10 ESSENTIAL HYPERTENSION, BENIGN: ICD-10-CM

## 2023-11-01 DIAGNOSIS — I35.1 NONRHEUMATIC AORTIC VALVE INSUFFICIENCY: Primary | ICD-10-CM

## 2023-11-01 DIAGNOSIS — I77.810 AORTIC ROOT DILATATION (H): ICD-10-CM

## 2023-11-01 DIAGNOSIS — R01.1 HEART MURMUR: ICD-10-CM

## 2023-11-01 PROCEDURE — 99214 OFFICE O/P EST MOD 30 MIN: CPT | Performed by: INTERNAL MEDICINE

## 2023-11-01 RX ORDER — METOPROLOL SUCCINATE 25 MG/1
25 TABLET, EXTENDED RELEASE ORAL DAILY
Qty: 90 TABLET | Refills: 3 | Status: SHIPPED | OUTPATIENT
Start: 2023-11-01

## 2023-11-01 RX ORDER — LISINOPRIL 20 MG/1
20 TABLET ORAL DAILY
Qty: 90 TABLET | Refills: 3 | Status: SHIPPED | OUTPATIENT
Start: 2023-11-01

## 2023-11-01 NOTE — PROGRESS NOTES
General Cardiology Clinic Progress Note  Francisco J Gilbert MRN# [id number]   YOB: 1945 Age: 78 year old       Reason for visit: Dilated ascending aorta, aortic valve regurgitation    History of presenting illness:    I had the opportunity to see Francisco J Gilbert at [facility] today for reevaluation of dilated ascending aorta and aortic valve disease.  His echocardiogram this year was done on 8/29/2023 and demonstrated some calcification of the aortic valve with mild to moderate aortic regurgitation and moderate dilation of the ascending aorta, measured at 4.5 cm at the aortic root and 4.6 cm within the ascending aorta.  This is unchanged compared to his last echo from 10/7/2022.  I reviewed the echo images with him in the office today.  Previously, I had firmly believe that the aortic valve was tricuspid, but this year had difficulty seeing the valve leaflets well enough to determine that.    He has some mild hypertension which we are treating with metoprolol XL and lisinopril.  He did not tolerate metoprolol XL 50 mg a day due to fatigue.  He is doing well with metoprolol XL 25 mg daily.  With the addition of lisinopril 20 mg daily, his blood pressure readings at home have been consistently excellent, and the range of 114/64 to 125/72.    He is feeling well without any concerning symptoms.  He denies shortness of breath, lightheadedness, palpitations, syncope.  He has no chest pain.              Assessment and Plan:     ASSESSMENT:    Mr. 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  He has moderate dilation of the ascending aorta, measured at 4.5 cm at the root and 4.6 cm in the mid ascending.  These findings are all stable compared to the previous echocardiogram from a year ago.  He has no significant aortic stenosis.  The mean gradient was 11 mmHg, mildly elevated due to  "the aortic regurgitation.    His blood pressure is well controlled at home consistently on metoprolol XL 25 mg daily plus lisinopril 20 mg a day.  I will continue those medications.    I again encouraged him to avoid heavy lifting and continue his daily aerobic exercise.    I will plan to see him back again in 1 year for reevaluation with an echocardiogram and office visit.    Shashank Wilson MD           Orders this Visit:  Orders Placed This Encounter   Procedures    Follow-Up with Cardiology    Echocardiogram Complete     Orders Placed This Encounter   Medications    lisinopril (ZESTRIL) 20 MG tablet     Sig: Take 1 tablet (20 mg) by mouth daily     Dispense:  90 tablet     Refill:  3    metoprolol succinate ER (TOPROL XL) 25 MG 24 hr tablet     Sig: Take 1 tablet (25 mg) by mouth daily     Dispense:  90 tablet     Refill:  3     Medications Discontinued During This Encounter   Medication Reason    lisinopril (ZESTRIL) 20 MG tablet Reorder (No AVS)    metoprolol succinate ER (TOPROL XL) 50 MG 24 hr tablet Reorder (No AVS)       Today's clinic visit entailed:    30 minutes spent by me on the date of the encounter doing chart review, history and exam, documentation and further activities per the note  Provider  Link to [facility] Help Grid     The level of medical decision making during this visit was of moderate complexity.           Review of Systems:     Review of Systems:  Skin:  Negative     Eyes:  Negative    ENT:  Negative    Respiratory:  Negative    Cardiovascular:  Negative;chest pain;palpitations;edema;dizziness;lightheadedness;fatigue Positive for  Gastroenterology: Negative    Genitourinary:  Negative    Musculoskeletal:  Negative    Neurologic:  Negative    Psychiatric:  Negative    Heme/Lymph/Imm:  Positive for allergies  Endocrine:  Negative              Physical Exam:     Vitals: /64   Pulse 63   Ht 1.778 m (5' 10\")   Wt 94.2 kg (207 lb 11.2 oz)   BMI 29.80 kg/m    Constitutional: Well nourished " and in no apparent distress.  Eyes: Pupils equal, round. Sclerae anicteric.   HEENT: Normocephalic, atraumatic.   Neck: Supple. JVD   Respiratory: Breathing non-labored. Lungs clear to auscultation bilaterally. No crackles, wheezes, rhonchi, or rales.  Cardiovascular:  Regular rate and rhythm, normal S1 and S2. No murmur, rub, or gallop.  Skin: Warm, dry. No rashes, cyanosis, or xanthelasma.  Extremities: No edema.  Neurologic: No gross motor deficits. Alert, awake, and oriented to person, place and time.  Psychiatric: Affect appropriate.             Medications:     Current Outpatient Medications   Medication Sig Dispense Refill    FISH OIL 1000 MG OR CAPS 1 cap QD  0    FLUTICASONE PROPIONATE (NASAL) 50 MCG/ACT NA SUSP 2 Sprays in eache nostril daily (Patient taking differently: As needed) 1 Month 12    lisinopril (ZESTRIL) 20 MG tablet Take 1 tablet (20 mg) by mouth daily 90 tablet 3    metoprolol succinate ER (TOPROL XL) 25 MG 24 hr tablet Take 1 tablet (25 mg) by mouth daily 90 tablet 3    MULTIVITAMINS OR TABS ONE DAILY 100 1 YEAR       No family history on file.    Social History     Socioeconomic History    Marital status:      Spouse name: Not on file    Number of children: Not on file    Years of education: Not on file    Highest education level: Not on file   Occupational History    Not on file   Tobacco Use    Smoking status: Never    Smokeless tobacco: Never   Substance and Sexual Activity    Alcohol use: Yes     Comment: wine every day    Drug use: Never    Sexual activity: Not on file   Other Topics Concern    Parent/sibling w/ CABG, MI or angioplasty before 65F 55M? Not Asked   Social History Narrative    Not on file     Social Determinants of Health     Financial Resource Strain: Not on file   Food Insecurity: Not on file   Transportation Needs: Not on file   Physical Activity: Not on file   Stress: Not on file   Social Connections: Not on file   Interpersonal Safety: Not on file   Housing  "Stability: Not on file            Past Medical History:     Past Medical History:   Diagnosis Date    Hypertension               Past Surgical History:     Past Surgical History:   Procedure Laterality Date    COLONOSCOPY N/A 12/8/2015    Procedure: COLONOSCOPY;  Surgeon: Miles Banerjee MD;  Location:  GI    TONSILLECTOMY                Allergies:   Sulfa antibiotics and Seasonal allergies       Data:   All laboratory data reviewed:    Recent Labs   Lab Test 07/03/21  0815   TRIG 107       No results found for: \"WBC\", \"RBC\", \"HGB\", \"HCT\", \"MCV\", \"MCH\", \"MCHC\", \"RDW\", \"PLT\"    Lab Results   Component Value Date    POTASSIUM 4.6 12/29/2006    CHLORIDE 94 (L) 08/24/2023     @ 07/01/2006    CR 0.9 10/21/2021    CR 0.90 12/29/2006    GFRESTIMATED >60 10/21/2021    GFRESTIMATED >90 12/29/2006    GFRESTBLACK  12/29/2006     >90  Stages of Chronic Kidney Disease  Stage 1:  GFR 90 or greater and other evidence of kidney damage*  Stage 2:  GFR 60-89 and other evidence of kidney damage *  Stage 3:  GFR 30-59  Stage 4:  GFR 15-29  Stage 5:  GFR less than 15 or dialysis  *Chronic kidney disease is defined as kidney damage or GFR less than 60   mL/min/1.73 m2 for three months or greater.  Kidney damage is defined as   pathologic abnormalities or markers or damage, including abnormalities in blood   or urine tests or imaging studies.      Lab Results   Component Value Date    AST 29 @ 07/01/2006    ALT 30 @ 07/01/2006       Lab Results   Component Value Date    A1C 5.0 @ 06/24/2005       No results found for: \"INR\"      ASHLEIGH LANDRY MD  [facility]  "

## 2023-11-01 NOTE — LETTER
11/1/2023    Seymour Reardon MD  [address]  [place] [facility] [address]    RE: Francisco J Gilbert       Dear Colleague,     I had the pleasure of seeing Francisco J Gilbert in the [facility].    General Cardiology Clinic Progress Note  Francisco J Gilbert MRN# [id number]   YOB: 1945 Age: 78 year old       Reason for visit: Dilated ascending aorta, aortic valve regurgitation    History of presenting illness:    I had the opportunity to see Francisco J Gilbert at [facility] today for reevaluation of dilated ascending aorta and aortic valve disease.  His echocardiogram this year was done on 8/29/2023 and demonstrated some calcification of the aortic valve with mild to moderate aortic regurgitation and moderate dilation of the ascending aorta, measured at 4.5 cm at the aortic root and 4.6 cm within the ascending aorta.  This is unchanged compared to his last echo from 10/7/2022.  I reviewed the echo images with him in the office today.  Previously, I had firmly believe that the aortic valve was tricuspid, but this year had difficulty seeing the valve leaflets well enough to determine that.    He has some mild hypertension which we are treating with metoprolol XL and lisinopril.  He did not tolerate metoprolol XL 50 mg a day due to fatigue.  He is doing well with metoprolol XL 25 mg daily.  With the addition of lisinopril 20 mg daily, his blood pressure readings at home have been consistently excellent, and the range of 114/64 to 125/72.    He is feeling well without any concerning symptoms.  He denies shortness of breath, lightheadedness, palpitations, syncope.  He has no chest pain.              Assessment and Plan:     ASSESSMENT:    Mr. 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  He has moderate dilation of the ascending aorta, measured at 4.5 cm at the root  and 4.6 cm in the mid ascending.  These findings are all stable compared to the previous echocardiogram from a year ago.  He has no significant aortic stenosis.  The mean gradient was 11 mmHg, mildly elevated due to the aortic regurgitation.    His blood pressure is well controlled at home consistently on metoprolol XL 25 mg daily plus lisinopril 20 mg a day.  I will continue those medications.    I again encouraged him to avoid heavy lifting and continue his daily aerobic exercise.    I will plan to see him back again in 1 year for reevaluation with an echocardiogram and office visit.    Shashank Wilson MD           Orders this Visit:  Orders Placed This Encounter   Procedures    Follow-Up with Cardiology    Echocardiogram Complete     Orders Placed This Encounter   Medications    lisinopril (ZESTRIL) 20 MG tablet     Sig: Take 1 tablet (20 mg) by mouth daily     Dispense:  90 tablet     Refill:  3    metoprolol succinate ER (TOPROL XL) 25 MG 24 hr tablet     Sig: Take 1 tablet (25 mg) by mouth daily     Dispense:  90 tablet     Refill:  3     Medications Discontinued During This Encounter   Medication Reason    lisinopril (ZESTRIL) 20 MG tablet Reorder (No AVS)    metoprolol succinate ER (TOPROL XL) 50 MG 24 hr tablet Reorder (No AVS)       Today's clinic visit entailed:    30 minutes spent by me on the date of the encounter doing chart review, history and exam, documentation and further activities per the note  Provider  Link to [facility] Help Grid     The level of medical decision making during this visit was of moderate complexity.           Review of Systems:     Review of Systems:  Skin:  Negative     Eyes:  Negative    ENT:  Negative    Respiratory:  Negative    Cardiovascular:  Negative;chest pain;palpitations;edema;dizziness;lightheadedness;fatigue Positive for  Gastroenterology: Negative    Genitourinary:  Negative    Musculoskeletal:  Negative    Neurologic:  Negative    Psychiatric:  Negative    Heme/Lymph/Imm:   "Positive for allergies  Endocrine:  Negative              Physical Exam:     Vitals: /64   Pulse 63   Ht 1.778 m (5' 10\")   Wt 94.2 kg (207 lb 11.2 oz)   BMI 29.80 kg/m    Constitutional: Well nourished and in no apparent distress.  Eyes: Pupils equal, round. Sclerae anicteric.   HEENT: Normocephalic, atraumatic.   Neck: Supple. JVD   Respiratory: Breathing non-labored. Lungs clear to auscultation bilaterally. No crackles, wheezes, rhonchi, or rales.  Cardiovascular:  Regular rate and rhythm, normal S1 and S2. No murmur, rub, or gallop.  Skin: Warm, dry. No rashes, cyanosis, or xanthelasma.  Extremities: No edema.  Neurologic: No gross motor deficits. Alert, awake, and oriented to person, place and time.  Psychiatric: Affect appropriate.             Medications:     Current Outpatient Medications   Medication Sig Dispense Refill    FISH OIL 1000 MG OR CAPS 1 cap QD  0    FLUTICASONE PROPIONATE (NASAL) 50 MCG/ACT NA SUSP 2 Sprays in eache nostril daily (Patient taking differently: As needed) 1 Month 12    lisinopril (ZESTRIL) 20 MG tablet Take 1 tablet (20 mg) by mouth daily 90 tablet 3    metoprolol succinate ER (TOPROL XL) 25 MG 24 hr tablet Take 1 tablet (25 mg) by mouth daily 90 tablet 3    MULTIVITAMINS OR TABS ONE DAILY 100 1 YEAR       No family history on file.    Social History     Socioeconomic History    Marital status:      Spouse name: Not on file    Number of children: Not on file    Years of education: Not on file    Highest education level: Not on file   Occupational History    Not on file   Tobacco Use    Smoking status: Never    Smokeless tobacco: Never   Substance and Sexual Activity    Alcohol use: Yes     Comment: wine every day    Drug use: Never    Sexual activity: Not on file   Other Topics Concern    Parent/sibling w/ CABG, MI or angioplasty before 65F 55M? Not Asked   Social History Narrative    Not on file     Social Determinants of Health     Financial Resource Strain: Not " "on file   Food Insecurity: Not on file   Transportation Needs: Not on file   Physical Activity: Not on file   Stress: Not on file   Social Connections: Not on file   Interpersonal Safety: Not on file   Housing Stability: Not on file            Past Medical History:     Past Medical History:   Diagnosis Date    Hypertension               Past Surgical History:     Past Surgical History:   Procedure Laterality Date    COLONOSCOPY N/A 12/8/2015    Procedure: COLONOSCOPY;  Surgeon: Miles Banerjee MD;  Location:  GI    TONSILLECTOMY                Allergies:   Sulfa antibiotics and Seasonal allergies       Data:   All laboratory data reviewed:    Recent Labs   Lab Test 07/03/21  0815   TRIG 107       No results found for: \"WBC\", \"RBC\", \"HGB\", \"HCT\", \"MCV\", \"MCH\", \"MCHC\", \"RDW\", \"PLT\"    Lab Results   Component Value Date    POTASSIUM 4.6 12/29/2006    CHLORIDE 94 (L) 08/24/2023     @ 07/01/2006    CR 0.9 10/21/2021    CR 0.90 12/29/2006    GFRESTIMATED >60 10/21/2021    GFRESTIMATED >90 12/29/2006    GFRESTBLACK  12/29/2006     >90  Stages of Chronic Kidney Disease  Stage 1:  GFR 90 or greater and other evidence of kidney damage*  Stage 2:  GFR 60-89 and other evidence of kidney damage *  Stage 3:  GFR 30-59  Stage 4:  GFR 15-29  Stage 5:  GFR less than 15 or dialysis  *Chronic kidney disease is defined as kidney damage or GFR less than 60   mL/min/1.73 m2 for three months or greater.  Kidney damage is defined as   pathologic abnormalities or markers or damage, including abnormalities in blood   or urine tests or imaging studies.      Lab Results   Component Value Date    AST 29 @ 07/01/2006    ALT 30 @ 07/01/2006       Lab Results   Component Value Date    A1C 5.0 @ 06/24/2005       No results found for: \"INR\"      ASHLEIGH LANDRY MD  [facility]    Thank you for allowing me to participate in the care of your patient.      Sincerely,   ASHLEIGH LANDRY MD   [facility]" [facility]  cc:   Shashank Wilson MD  [address]  [place] [address]

## 2023-11-01 NOTE — PATIENT INSTRUCTIONS
It was a pleasure seeing you today and thank you for allowing me to be a part of your health care team.  Should you have any questions regarding your visit or future needs please feel free to reach out to my care team for assistance.      Thank you, Dr. Shashank Wilson        **Nursing: [phone]       **Scheduling: [phone]

## 2024-06-04 ENCOUNTER — APPOINTMENT (OUTPATIENT)
Dept: URBAN - METROPOLITAN AREA CLINIC 256 | Age: 79
Setting detail: DERMATOLOGY
End: 2024-06-05

## 2024-06-04 DIAGNOSIS — L57.8 OTHER SKIN CHANGES DUE TO CHRONIC EXPOSURE TO NONIONIZING RADIATION: ICD-10-CM

## 2024-06-04 DIAGNOSIS — D22 MELANOCYTIC NEVI: ICD-10-CM

## 2024-06-04 DIAGNOSIS — L28.1 PRURIGO NODULARIS: ICD-10-CM

## 2024-06-04 DIAGNOSIS — D18.0 HEMANGIOMA: ICD-10-CM

## 2024-06-04 DIAGNOSIS — L82.1 OTHER SEBORRHEIC KERATOSIS: ICD-10-CM

## 2024-06-04 DIAGNOSIS — L57.0 ACTINIC KERATOSIS: ICD-10-CM

## 2024-06-04 DIAGNOSIS — Z71.89 OTHER SPECIFIED COUNSELING: ICD-10-CM

## 2024-06-04 PROBLEM — D22.39 MELANOCYTIC NEVI OF OTHER PARTS OF FACE: Status: ACTIVE | Noted: 2024-06-04

## 2024-06-04 PROBLEM — D18.01 HEMANGIOMA OF SKIN AND SUBCUTANEOUS TISSUE: Status: ACTIVE | Noted: 2024-06-04

## 2024-06-04 PROBLEM — D22.5 MELANOCYTIC NEVI OF TRUNK: Status: ACTIVE | Noted: 2024-06-04

## 2024-06-04 PROBLEM — D22.61 MELANOCYTIC NEVI OF RIGHT UPPER LIMB, INCLUDING SHOULDER: Status: ACTIVE | Noted: 2024-06-04

## 2024-06-04 PROBLEM — D22.62 MELANOCYTIC NEVI OF LEFT UPPER LIMB, INCLUDING SHOULDER: Status: ACTIVE | Noted: 2024-06-04

## 2024-06-04 PROCEDURE — OTHER LIQUID NITROGEN: OTHER

## 2024-06-04 PROCEDURE — 99213 OFFICE O/P EST LOW 20 MIN: CPT | Mod: 25

## 2024-06-04 PROCEDURE — OTHER COUNSELING: OTHER

## 2024-06-04 PROCEDURE — 17000 DESTRUCT PREMALG LESION: CPT

## 2024-06-04 PROCEDURE — OTHER MIPS QUALITY: OTHER

## 2024-06-04 PROCEDURE — 17003 DESTRUCT PREMALG LES 2-14: CPT

## 2024-06-04 ASSESSMENT — LOCATION DETAILED DESCRIPTION DERM
LOCATION DETAILED: RIGHT VENTRAL PROXIMAL FOREARM
LOCATION DETAILED: LEFT INFERIOR LATERAL MALAR CHEEK
LOCATION DETAILED: SUPERIOR THORACIC SPINE
LOCATION DETAILED: RIGHT SUPERIOR OCCIPITAL SCALP
LOCATION DETAILED: LEFT SUPERIOR MEDIAL FOREHEAD
LOCATION DETAILED: LEFT SUPERIOR MEDIAL UPPER BACK
LOCATION DETAILED: POSTERIOR MID-PARIETAL SCALP
LOCATION DETAILED: LEFT VENTRAL PROXIMAL FOREARM
LOCATION DETAILED: LEFT ANTECUBITAL SKIN
LOCATION DETAILED: LEFT SUPERIOR FOREHEAD
LOCATION DETAILED: LEFT INFERIOR CENTRAL MALAR CHEEK
LOCATION DETAILED: LEFT CENTRAL MALAR CHEEK
LOCATION DETAILED: LEFT INFERIOR OCCIPITAL SCALP

## 2024-06-04 ASSESSMENT — LOCATION ZONE DERM
LOCATION ZONE: TRUNK
LOCATION ZONE: ARM
LOCATION ZONE: SCALP
LOCATION ZONE: FACE

## 2024-06-04 ASSESSMENT — LOCATION SIMPLE DESCRIPTION DERM
LOCATION SIMPLE: LEFT FOREHEAD
LOCATION SIMPLE: LEFT CHEEK
LOCATION SIMPLE: UPPER BACK
LOCATION SIMPLE: LEFT UPPER ARM
LOCATION SIMPLE: POSTERIOR SCALP
LOCATION SIMPLE: LEFT FOREARM
LOCATION SIMPLE: LEFT UPPER BACK
LOCATION SIMPLE: RIGHT FOREARM

## 2024-06-04 NOTE — PROCEDURE: LIQUID NITROGEN
Show Applicator Variable?: Yes
Render Post-Care Instructions In Note?: no
Post-Care Instructions: I reviewed with the patient in detail post-care instructions. Patient is to wear sunprotection, and avoid picking at any of the treated lesions. Pt may apply Vaseline to crusted or scabbing areas.
Consent: The patient's verbal consent was obtained including but not limited to risks of crusting, scabbing, blistering, scarring, darker or lighter pigmentary change, recurrence, incomplete removal and infection.
Duration Of Freeze Thaw-Cycle (Seconds): 5
Application Tool (Optional): Liquid Nitrogen Sprayer
Number Of Freeze-Thaw Cycles: 1 freeze-thaw cycle
Detail Level: Detailed

## 2024-06-04 NOTE — HPI: ABOVE THE WAIST SKIN CHECK
Additional History: The patient notes a few lesions of concern located on the scalp and face that are red and scaly. He seeks evaluation and management.

## 2024-12-10 ENCOUNTER — APPOINTMENT (OUTPATIENT)
Dept: URBAN - METROPOLITAN AREA CLINIC 256 | Age: 79
Setting detail: DERMATOLOGY
End: 2024-12-11

## 2024-12-10 DIAGNOSIS — L72.8 OTHER FOLLICULAR CYSTS OF THE SKIN AND SUBCUTANEOUS TISSUE: ICD-10-CM

## 2024-12-10 DIAGNOSIS — L64.8 OTHER ANDROGENIC ALOPECIA: ICD-10-CM

## 2024-12-10 DIAGNOSIS — L57.0 ACTINIC KERATOSIS: ICD-10-CM

## 2024-12-10 DIAGNOSIS — L72.0 EPIDERMAL CYST: ICD-10-CM

## 2024-12-10 DIAGNOSIS — L57.8 OTHER SKIN CHANGES DUE TO CHRONIC EXPOSURE TO NONIONIZING RADIATION: ICD-10-CM

## 2024-12-10 PROCEDURE — OTHER MIPS QUALITY: OTHER

## 2024-12-10 PROCEDURE — 17000 DESTRUCT PREMALG LESION: CPT

## 2024-12-10 PROCEDURE — OTHER PATIENT SPECIFIC COUNSELING: OTHER

## 2024-12-10 PROCEDURE — 17003 DESTRUCT PREMALG LES 2-14: CPT

## 2024-12-10 PROCEDURE — OTHER LIQUID NITROGEN: OTHER

## 2024-12-10 PROCEDURE — 99213 OFFICE O/P EST LOW 20 MIN: CPT | Mod: 25

## 2024-12-10 PROCEDURE — OTHER COUNSELING: OTHER

## 2024-12-10 ASSESSMENT — LOCATION SIMPLE DESCRIPTION DERM
LOCATION SIMPLE: SCALP
LOCATION SIMPLE: LEFT CHEEK
LOCATION SIMPLE: POSTERIOR SCALP
LOCATION SIMPLE: CHEST
LOCATION SIMPLE: RIGHT SCALP

## 2024-12-10 ASSESSMENT — LOCATION DETAILED DESCRIPTION DERM
LOCATION DETAILED: STERNUM
LOCATION DETAILED: RIGHT MEDIAL FRONTAL SCALP
LOCATION DETAILED: LEFT INFERIOR LATERAL MALAR CHEEK
LOCATION DETAILED: POSTERIOR MID-PARIETAL SCALP
LOCATION DETAILED: LEFT SUPERIOR FRONTAL SCALP

## 2024-12-10 ASSESSMENT — LOCATION ZONE DERM
LOCATION ZONE: SCALP
LOCATION ZONE: FACE
LOCATION ZONE: TRUNK

## 2024-12-10 NOTE — PROCEDURE: COUNSELING
Detail Level: Detailed
Pedro Lund (friend) contacted at [phone] and updated on pt status per pt request.       Pauline Dixon RN  10/10/20 2470
Detail Level: Zone

## 2024-12-10 NOTE — PROCEDURE: LIQUID NITROGEN
Show Applicator Variable?: Yes
Render Post-Care Instructions In Note?: no
Post-Care Instructions: I reviewed with the patient in detail post-care instructions. Patient is to wear sunprotection, and avoid picking at any of the treated lesions. Pt may apply Vaseline to crusted or scabbing areas.
Consent: The patient's verbal consent was obtained including but not limited to risks of crusting, scabbing, blistering, scarring, darker or lighter pigmentary change, recurrence, incomplete removal and infection.
Duration Of Freeze Thaw-Cycle (Seconds): 5
Application Tool (Optional): Liquid Nitrogen Sprayer
Number Of Freeze-Thaw Cycles: 1 freeze-thaw cycle
Detail Level: Simple

## 2024-12-10 NOTE — PROCEDURE: PATIENT SPECIFIC COUNSELING
- Advised the patient to not traumatize it. \\n- Informed the patient that surgical excision is the only way for removal.
Detail Level: Zone
Recommended hair transplant.

## 2024-12-11 DIAGNOSIS — R01.1 HEART MURMUR: ICD-10-CM

## 2024-12-11 DIAGNOSIS — I77.810 AORTIC ROOT DILATATION (H): ICD-10-CM

## 2024-12-11 RX ORDER — METOPROLOL SUCCINATE 25 MG/1
25 TABLET, EXTENDED RELEASE ORAL DAILY
Qty: 90 TABLET | Refills: 0 | Status: SHIPPED | OUTPATIENT
Start: 2024-12-11

## 2024-12-11 NOTE — LETTER
December 11, 2024       TO: Francisco J Gilbert  [address]  [place] [address]       Dear Francisco J Gilbert,    We recently received a call from your pharmacy requesting a refill of your medication(s).    Our records indicate that you are due for follow-up with your Heart Care Provider. We will refill your medications for 3 months which will allow you enough time to be seen.    Please call [phone] to schedule your appointment.    Thank you for allowing [facility] to be a part of your health care team and we look forward to seeing you soon.    Thank you,    [facility]

## 2024-12-21 ENCOUNTER — HEALTH MAINTENANCE LETTER (OUTPATIENT)
Age: 79
End: 2024-12-21

## 2024-12-31 ENCOUNTER — CARE COORDINATION (OUTPATIENT)
Dept: CARDIOLOGY | Facility: CLINIC | Age: 79
End: 2024-12-31

## 2024-12-31 ENCOUNTER — HOSPITAL ENCOUNTER (OUTPATIENT)
Dept: CARDIOLOGY | Facility: CLINIC | Age: 79
Discharge: HOME | End: 2024-12-31
Attending: INTERNAL MEDICINE
Payer: COMMERCIAL

## 2024-12-31 DIAGNOSIS — I10 ESSENTIAL HYPERTENSION, BENIGN: Primary | ICD-10-CM

## 2024-12-31 DIAGNOSIS — I35.1 NONRHEUMATIC AORTIC VALVE INSUFFICIENCY: ICD-10-CM

## 2024-12-31 DIAGNOSIS — I77.810 AORTIC ROOT DILATATION (H): ICD-10-CM

## 2024-12-31 LAB — LVEF ECHO: NORMAL

## 2024-12-31 PROCEDURE — 93306 TTE W/DOPPLER COMPLETE: CPT

## 2024-12-31 NOTE — PROGRESS NOTES
Echo results from 12/31 noted. Pt doesn't have an annual visit set up, and is on the waitlist to see . Pt has hx of aortic valve insufficiency and aortic dilatation. Will send  an update on results. Govind ALMEIDA December 31, 2024, 4:05 PM        HR: 65  BP: 186/85 mmHg  ______________________________________________________________________________  Procedure  Echocardiogram with two-dimensional, color and spectral Doppler.     ______________________________________________________________________________  Interpretation Summary     Severe hypertension at time of study (186/85)  The left ventricle is normal in size.  There is moderate concentric left ventricular hypertrophy.  The visual ejection fraction is 60-65%.  The right ventricle is normal in structure, function and size.  There is moderate trileaflet aortic sclerosis. Mean gradient 9 mmHg across the  aortic valve.  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 Both of these measurements continue to increase on each  subsequent echo (4.5 and 4.6 cm on last year's).

## 2025-01-07 NOTE — TELEPHONE ENCOUNTER
Although the increase in the size of his aorta is small, I am concerned about the overall size and would recommend a CT angiogram of the thoracic aorta for further evaluation.  He has not had a basic metabolic panel for evaluation of kidney function since August 2023.  Will need to repeat a BMP first and then if his kidney function remains stable, refer him for a CTA.  I would also like to understand what his blood pressure numbers are at home recently due to the elevated blood pressure reading at the echo.  We can then decide whether we should alter his medications for better blood pressure control.    Shashank Wilson MD

## 2025-01-07 NOTE — PROGRESS NOTES
Pt called back to discuss the echo results. He is leaving for [place] on Saturday 1/11 and wants to know the urgency of needing the CT scan. Pt said if  thinks he should not delay getting it he will either delay his trip or see a provider in [place] who can order it. Otherwise he will be back in May and can have it at that time. Pt said he has significant white coat HTN so it's always high at procedures, but it was been 120s/70s at home. I will update . Govind ALMEIDA January 7, 2025, 2:12 PM

## 2025-01-07 NOTE — PROGRESS NOTES
" reviewed update and said, \"Yes, May is ok. No hurry. I don't think the aorta is large enough that it will need to be fixed (5.5-6 cm), but I think we need a more exact baseline for monitoring which can be done in May. He should avoid heavy lifting over 40 pounds.No other restrictions. \"    I called pt with an update. Pt said he comes back on May 15th and will set up a bmp lab right when he comes back. Will wait to make sure it's stable before he sets up the CT scan. Pt needs to have his annual visit with . I found an opening on 7/8 @ 4:00 in [place]. Pt said this works so I will have scheduling put him in that slot. That should allow enough time to get a CT scan ordered and set up once the bmp is done.  Govind ALMEIDA January 7, 2025, 2:53 PM      "

## 2025-01-07 NOTE — PROGRESS NOTES
I left a message for pt to call back to review echo results and recommendations from . Govind ALMEIDA January 7, 2025, 9:43 AM

## 2025-01-07 NOTE — PROGRESS NOTES
Pt left a message returning my call. I left message for pt to call back. I will also send a my chart update. Govind ALMEIDA January 7, 2025, 1:27 PM

## 2025-01-23 ENCOUNTER — MYC REFILL (OUTPATIENT)
Dept: CARDIOLOGY | Facility: CLINIC | Age: 80
End: 2025-01-23
Payer: COMMERCIAL

## 2025-01-23 DIAGNOSIS — R01.1 HEART MURMUR: ICD-10-CM

## 2025-01-23 DIAGNOSIS — I77.810 AORTIC ROOT DILATATION: ICD-10-CM

## 2025-01-23 RX ORDER — METOPROLOL SUCCINATE 25 MG/1
25 TABLET, EXTENDED RELEASE ORAL DAILY
Qty: 90 TABLET | Refills: 0 | Status: SHIPPED | OUTPATIENT
Start: 2025-01-23

## 2025-03-06 ENCOUNTER — TRANSFERRED RECORDS (OUTPATIENT)
Dept: HEALTH INFORMATION MANAGEMENT | Facility: CLINIC | Age: 80
End: 2025-03-06

## 2025-03-18 DIAGNOSIS — R01.1 HEART MURMUR: ICD-10-CM

## 2025-03-18 DIAGNOSIS — I77.810 AORTIC ROOT DILATATION: ICD-10-CM

## 2025-03-18 RX ORDER — METOPROLOL SUCCINATE 25 MG/1
25 TABLET, EXTENDED RELEASE ORAL DAILY
Qty: 90 TABLET | Refills: 0 | Status: SHIPPED | OUTPATIENT
Start: 2025-03-18

## 2025-05-29 ENCOUNTER — LAB (OUTPATIENT)
Dept: LAB | Facility: CLINIC | Age: 80
End: 2025-05-29
Payer: COMMERCIAL

## 2025-05-29 DIAGNOSIS — I10 ESSENTIAL HYPERTENSION, BENIGN: ICD-10-CM

## 2025-06-03 ENCOUNTER — RESULTS FOLLOW-UP (OUTPATIENT)
Dept: CARDIOLOGY | Facility: CLINIC | Age: 80
End: 2025-06-03

## 2025-07-03 ENCOUNTER — HOSPITAL ENCOUNTER (OUTPATIENT)
Dept: CARDIOLOGY | Facility: CLINIC | Age: 80
End: 2025-07-03
Attending: INTERNAL MEDICINE
Payer: COMMERCIAL

## 2025-07-03 ENCOUNTER — RESULTS FOLLOW-UP (OUTPATIENT)
Dept: CARDIOLOGY | Facility: CLINIC | Age: 80
End: 2025-07-03

## 2025-07-03 DIAGNOSIS — I77.810 AORTIC ROOT DILATATION: ICD-10-CM

## 2025-07-03 DIAGNOSIS — I71.20 ANEURYSM OF THORACIC AORTA: ICD-10-CM

## 2025-07-03 LAB
CREAT BLD-MCNC: 0.9 MG/DL (ref 0.7–1.2)
EGFRCR SERPLBLD CKD-EPI 2021: >60 ML/MIN/1.73M2

## 2025-07-03 PROCEDURE — 250N000011 HC RX IP 250 OP 636: Performed by: INTERNAL MEDICINE

## 2025-07-03 PROCEDURE — 71275 CT ANGIOGRAPHY CHEST: CPT

## 2025-07-03 PROCEDURE — 82565 ASSAY OF CREATININE: CPT

## 2025-07-03 RX ORDER — LIDOCAINE 40 MG/G
CREAM TOPICAL
OUTPATIENT
Start: 2025-07-03

## 2025-07-03 RX ORDER — IOPAMIDOL 755 MG/ML
90 INJECTION, SOLUTION INTRAVASCULAR ONCE
Status: COMPLETED | OUTPATIENT
Start: 2025-07-03 | End: 2025-07-03

## 2025-07-03 RX ORDER — NITROGLYCERIN 0.4 MG/1
0.4 TABLET SUBLINGUAL
Status: ACTIVE | OUTPATIENT
Start: 2025-07-03

## 2025-07-03 RX ORDER — ONDANSETRON 2 MG/ML
4 INJECTION INTRAMUSCULAR; INTRAVENOUS
Status: ACTIVE | OUTPATIENT
Start: 2025-07-03

## 2025-07-03 RX ADMIN — IOPAMIDOL 90 ML: 755 INJECTION, SOLUTION INTRAVENOUS at 14:38

## 2025-07-08 ENCOUNTER — OFFICE VISIT (OUTPATIENT)
Dept: CARDIOLOGY | Facility: CLINIC | Age: 80
End: 2025-07-08
Attending: INTERNAL MEDICINE
Payer: COMMERCIAL

## 2025-07-08 VITALS
HEART RATE: 55 BPM | SYSTOLIC BLOOD PRESSURE: 116 MMHG | BODY MASS INDEX: 28.55 KG/M2 | DIASTOLIC BLOOD PRESSURE: 61 MMHG | WEIGHT: 199 LBS

## 2025-07-08 DIAGNOSIS — I35.0 NONRHEUMATIC AORTIC VALVE STENOSIS: ICD-10-CM

## 2025-07-08 DIAGNOSIS — I10 ESSENTIAL HYPERTENSION, BENIGN: ICD-10-CM

## 2025-07-08 DIAGNOSIS — I77.810 AORTIC ROOT DILATATION: ICD-10-CM

## 2025-07-08 DIAGNOSIS — I71.20 THORACIC AORTIC ANEURYSM WITHOUT RUPTURE, UNSPECIFIED PART: Primary | ICD-10-CM

## 2025-07-08 DIAGNOSIS — R01.1 HEART MURMUR: ICD-10-CM

## 2025-07-08 RX ORDER — LISINOPRIL 20 MG/1
20 TABLET ORAL DAILY
Qty: 90 TABLET | Refills: 3 | Status: SHIPPED | OUTPATIENT
Start: 2025-07-08

## 2025-07-08 RX ORDER — METOPROLOL SUCCINATE 25 MG/1
25 TABLET, EXTENDED RELEASE ORAL DAILY
Qty: 90 TABLET | Refills: 3 | Status: SHIPPED | OUTPATIENT
Start: 2025-07-08

## 2025-07-08 NOTE — PROGRESS NOTES
General Cardiology Clinic Progress Note  Francisco J Gilbert MRN# [id number]   YOB: 1945 Age: 80 year old       Reason for visit: Ascending aortic aneurysm, aortic valve disease    History of presenting illness:    I had the opportunity to see Francisco J Gilbert at [facility] today for reevaluation of ascending aortic aneurysm and aortic valve disease.  I last saw him on 11/1/2023.  At that time he had an echocardiogram from 8/29/2023 showing the maximum diameter of the mid ascending aorta was 4.6 cm.  The aortic valve appeared trileaflet but sclerotic with mild to moderate aortic regurgitation and no significant stenosis.  Follow-up echocardiogram on 12/31/2024 demonstrated a maximal dimension of the mid ascending aorta at 4.8 cm with mild aortic regurgitation.  I suggested a CT angiogram of the thoracic aorta prior to my visit with him this year.  That study was completed on 7/3/2025 and demonstrated enlargement of the aortic root (4.5 cm) and ascending aorta (4.8 cm).    Widespread coronary calcification was incidentally noted.    He has been feeling well without chest pain or shortness of breath symptoms.  He continues to walk 25 miles a week and reports losing 40 pounds over the last several years as a result of his improve diet and exercise program.  His cholesterol numbers are quite good without medical therapy, including an LDL of 92 in 2021.    He brings in a list of blood pressures with him today which are all excellent.  Typically his blood pressure is 120/80 or less.    Yesterday his blood pressure was 116/61, heart rate 55, and weight 199 pounds.  His lungs are clear.  Heart rhythm is regular.  He has a soft systolic murmur.  He has no carotid bruits.              Assessment and Plan:     ASSESSMENT:      Mr. Francisco J Gilbert is a 78-year-old gentleman with an aortic valve that appears likely trileaflet with sclerosis and mild regurgitation without stenosis.  He has an ascending  aortic aneurysm with maximum dimension of 4.8 cm within the mid ascending aorta.  This is unchanged since the echocardiogram done on 12/31/2024.  The aorta has increased in size by 2 mm since 2 years ago.    His blood pressure is well-controlled.  Does have some coronary calcification but his cholesterol numbers look quite good in 2021.  I suggested that he follow-up with Dr. Reardon, have his cholesterol numbers rechecked, and consider medical therapy to treat his cholesterol if his LDL is greater than 100, or greater than 70 if he chooses to be more aggressive.    I will have him follow-up with me again in 1 year for reevaluation with an echocardiogram prior to my visit with him.    Shashank Wilson MD         Orders this Visit:  Orders Placed This Encounter   Procedures    Follow-Up with Cardiology    Echocardiogram Complete     Orders Placed This Encounter   Medications    metoprolol succinate ER (TOPROL XL) 25 MG 24 hr tablet     Sig: Take 1 tablet (25 mg) by mouth daily.     Dispense:  90 tablet     Refill:  3    lisinopril (ZESTRIL) 20 MG tablet     Sig: Take 1 tablet (20 mg) by mouth daily.     Dispense:  90 tablet     Refill:  3     Medications Discontinued During This Encounter   Medication Reason    lisinopril (ZESTRIL) 20 MG tablet Reorder (No AVS)    metoprolol succinate ER (TOPROL XL) 25 MG 24 hr tablet Reorder (No AVS)       Today's clinic visit entailed:    35 minutes spent by me on the date of the encounter doing chart review, history and exam, documentation and further activities per the note  Provider  Link to [facility] Help Grid     The level of medical decision making during this visit was of high complexity.           Review of Systems:     Review of Systems:  Skin:        Eyes:       ENT:       Respiratory:  Negative shortness of breath  Cardiovascular:  Negative, palpitations, chest pain, edema, fatigue, lightheadedness, dizziness    Gastroenterology:      Genitourinary:       Musculoskeletal:        Neurologic:       Psychiatric:       Heme/Lymph/Imm:       Endocrine:                 Physical Exam:     Vitals: /61   Pulse 55   Wt 90.3 kg (199 lb)   BMI 28.55 kg/m    Constitutional: Well nourished and in no apparent distress.  Eyes: Pupils equal, round. Sclerae anicteric.   HEENT: Normocephalic, atraumatic.   Neck: Supple. JVD   Respiratory: Breathing non-labored. Lungs clear to auscultation bilaterally. No crackles, wheezes, rhonchi, or rales.  Cardiovascular:  Regular rate and rhythm, normal S1 and S2. No murmur, rub, or gallop.  Skin: Warm, dry. No rashes, cyanosis, or xanthelasma.  Extremities: No edema.  Neurologic: No gross motor deficits. Alert, awake, and oriented to person, place and time.  Psychiatric: Affect appropriate.             Medications:     Current Outpatient Medications   Medication Sig Dispense Refill    FISH OIL 1000 MG OR CAPS 1 cap QD  0    FLUTICASONE PROPIONATE (NASAL) 50 MCG/ACT NA SUSP 2 Sprays in eache nostril daily 1 Month 12    lisinopril (ZESTRIL) 20 MG tablet Take 1 tablet (20 mg) by mouth daily. 90 tablet 3    metoprolol succinate ER (TOPROL XL) 25 MG 24 hr tablet Take 1 tablet (25 mg) by mouth daily. 90 tablet 3    MULTIVITAMINS OR TABS ONE DAILY 100 1 YEAR       No family history on file.    Social History     Socioeconomic History    Marital status:      Spouse name: Not on file    Number of children: Not on file    Years of education: Not on file    Highest education level: Not on file   Occupational History    Not on file   Tobacco Use    Smoking status: Never    Smokeless tobacco: Never   Substance and Sexual Activity    Alcohol use: Yes     Comment: wine every day    Drug use: Never    Sexual activity: Not on file   Other Topics Concern    Parent/sibling w/ CABG, MI or angioplasty before 65F 55M? Not Asked   Social History Narrative    Not on file     Social Drivers of Health     Financial Resource Strain: Not on file   Food Insecurity: Not on file  "  Transportation Needs: Not on file   Physical Activity: Not on file   Stress: Not on file   Social Connections: Not on file   Interpersonal Safety: Unknown (2/18/2024)    Received from [facility]    Domestic Violence Screen     Physical Abuse Risk: Unknown     Verbal Abuse Risk: Unknown   Housing Stability: Not on file            Past Medical History:     Past Medical History:   Diagnosis Date    Hypertension               Past Surgical History:     Past Surgical History:   Procedure Laterality Date    COLONOSCOPY N/A 12/8/2015    Procedure: COLONOSCOPY;  Surgeon: Miles Banerjee MD;  Location:  GI    TONSILLECTOMY                Allergies:   Sulfa antibiotics and Seasonal allergies       Data:   All laboratory data reviewed:    Recent Labs   Lab Test 07/03/21  0815   TRIG 107       No results found for: \"WBC\", \"RBC\", \"HGB\", \"HCT\", \"MCV\", \"MCH\", \"MCHC\", \"RDW\", \"PLT\"    Lab Results   Component Value Date     05/29/2025    POTASSIUM 4.5 05/29/2025    POTASSIUM 4.6 12/29/2006    CHLORIDE 105 05/29/2025    CHLORIDE 94 (L) 08/24/2023    CO2 26 05/29/2025    ANIONGAP 9 05/29/2025    GLC 98 05/29/2025     @ 07/01/2006    BUN 16.9 05/29/2025    CR 0.9 07/03/2025    CR 0.78 05/29/2025    CR 0.90 12/29/2006    GFRESTIMATED >60 07/03/2025    GFRESTIMATED >90 12/29/2006    GFRESTBLACK  12/29/2006     >90  Stages of Chronic Kidney Disease  Stage 1:  GFR 90 or greater and other evidence of kidney damage*  Stage 2:  GFR 60-89 and other evidence of kidney damage *  Stage 3:  GFR 30-59  Stage 4:  GFR 15-29  Stage 5:  GFR less than 15 or dialysis  *Chronic kidney disease is defined as kidney damage or GFR less than 60   mL/min/1.73 m2 for three months or greater.  Kidney damage is defined as   pathologic abnormalities or markers or damage, including abnormalities in blood   or urine tests or imaging studies.    ARNULFO 9.7 05/29/2025      Lab Results   Component Value Date    AST 29 @ 07/01/2006    ALT " "30 @ 07/01/2006       Lab Results   Component Value Date    A1C 5.0 @ 06/24/2005       No results found for: \"INR\"      ASHLEIGH LANDRY MD  [facility]  "

## 2025-07-08 NOTE — PATIENT INSTRUCTIONS
It was a pleasure seeing you today and thank you for allowing me to be a part of your health care team.  Please be aware that your follow-up visit may be scheduled with one of the Advanced Practice Providers (Nurse Practitioner or Physician Assistant) on our care team. We are a team and work closely together to meet your health care needs.  Should you have any questions regarding your visit or future needs please feel free to reach out to my care team for assistance.      Thank you, Dr. Shashank Wilson        **Nursing: [phone]       **Scheduling: [phone]

## 2025-07-08 NOTE — LETTER
7/8/2025    Seymour Reardon MD  [address]  [facility] [address]    RE: Francisco J Gilbert       Dear Colleague,     I had the pleasure of seeing Francisco J Gilbert in the [facility].    General Cardiology Clinic Progress Note  Francisco J Gilbert MRN# [id number]   YOB: 1945 Age: 80 year old       Reason for visit: Ascending aortic aneurysm, aortic valve disease    History of presenting illness:    I had the opportunity to see Francisco J Gilbert at [facility] today for reevaluation of ascending aortic aneurysm and aortic valve disease.  I last saw him on 11/1/2023.  At that time he had an echocardiogram from 8/29/2023 showing the maximum diameter of the mid ascending aorta was 4.6 cm.  The aortic valve appeared trileaflet but sclerotic with mild to moderate aortic regurgitation and no significant stenosis.  Follow-up echocardiogram on 12/31/2024 demonstrated a maximal dimension of the mid ascending aorta at 4.8 cm with mild aortic regurgitation.  I suggested a CT angiogram of the thoracic aorta prior to my visit with him this year.  That study was completed on 7/3/2025 and demonstrated enlargement of the aortic root (4.5 cm) and ascending aorta (4.8 cm).    Widespread coronary calcification was incidentally noted.    He has been feeling well without chest pain or shortness of breath symptoms.  He continues to walk 25 miles a week and reports losing 40 pounds over the last several years as a result of his improve diet and exercise program.  His cholesterol numbers are quite good without medical therapy, including an LDL of 92 in 2021.    He brings in a list of blood pressures with him today which are all excellent.  Typically his blood pressure is 120/80 or less.    Yesterday his blood pressure was 116/61, heart rate 55, and weight 199 pounds.  His lungs are clear.  Heart rhythm is regular.  He has a soft systolic murmur.  He has no carotid bruits.               Assessment and Plan:     ASSESSMENT:      Mr. Francisco J Gilbert is a 78-year-old gentleman with an aortic valve that appears likely trileaflet with sclerosis and mild regurgitation without stenosis.  He has an ascending aortic aneurysm with maximum dimension of 4.8 cm within the mid ascending aorta.  This is unchanged since the echocardiogram done on 12/31/2024.  The aorta has increased in size by 2 mm since 2 years ago.    His blood pressure is well-controlled.  Does have some coronary calcification but his cholesterol numbers look quite good in 2021.  I suggested that he follow-up with Dr. Reardon, have his cholesterol numbers rechecked, and consider medical therapy to treat his cholesterol if his LDL is greater than 100, or greater than 70 if he chooses to be more aggressive.    I will have him follow-up with me again in 1 year for reevaluation with an echocardiogram prior to my visit with him.    Shashank Wilson MD         Orders this Visit:  Orders Placed This Encounter   Procedures     Follow-Up with Cardiology     Echocardiogram Complete     Orders Placed This Encounter   Medications     metoprolol succinate ER (TOPROL XL) 25 MG 24 hr tablet     Sig: Take 1 tablet (25 mg) by mouth daily.     Dispense:  90 tablet     Refill:  3     lisinopril (ZESTRIL) 20 MG tablet     Sig: Take 1 tablet (20 mg) by mouth daily.     Dispense:  90 tablet     Refill:  3     Medications Discontinued During This Encounter   Medication Reason     lisinopril (ZESTRIL) 20 MG tablet Reorder (No AVS)     metoprolol succinate ER (TOPROL XL) 25 MG 24 hr tablet Reorder (No AVS)       Today's clinic visit entailed:    35 minutes spent by me on the date of the encounter doing chart review, history and exam, documentation and further activities per the note  Provider  Link to [facility] Help Grid     The level of medical decision making during this visit was of high complexity.           Review of Systems:     Review of Systems:  Skin:        Eyes:        ENT:       Respiratory:  Negative shortness of breath  Cardiovascular:  Negative, palpitations, chest pain, edema, fatigue, lightheadedness, dizziness    Gastroenterology:      Genitourinary:       Musculoskeletal:       Neurologic:       Psychiatric:       Heme/Lymph/Imm:       Endocrine:                 Physical Exam:     Vitals: /61   Pulse 55   Wt 90.3 kg (199 lb)   BMI 28.55 kg/m    Constitutional: Well nourished and in no apparent distress.  Eyes: Pupils equal, round. Sclerae anicteric.   HEENT: Normocephalic, atraumatic.   Neck: Supple. JVD   Respiratory: Breathing non-labored. Lungs clear to auscultation bilaterally. No crackles, wheezes, rhonchi, or rales.  Cardiovascular:  Regular rate and rhythm, normal S1 and S2. No murmur, rub, or gallop.  Skin: Warm, dry. No rashes, cyanosis, or xanthelasma.  Extremities: No edema.  Neurologic: No gross motor deficits. Alert, awake, and oriented to person, place and time.  Psychiatric: Affect appropriate.             Medications:     Current Outpatient Medications   Medication Sig Dispense Refill     FISH OIL 1000 MG OR CAPS 1 cap QD  0     FLUTICASONE PROPIONATE (NASAL) 50 MCG/ACT NA SUSP 2 Sprays in eache nostril daily 1 Month 12     lisinopril (ZESTRIL) 20 MG tablet Take 1 tablet (20 mg) by mouth daily. 90 tablet 3     metoprolol succinate ER (TOPROL XL) 25 MG 24 hr tablet Take 1 tablet (25 mg) by mouth daily. 90 tablet 3     MULTIVITAMINS OR TABS ONE DAILY 100 1 YEAR       No family history on file.    Social History     Socioeconomic History     Marital status:      Spouse name: Not on file     Number of children: Not on file     Years of education: Not on file     Highest education level: Not on file   Occupational History     Not on file   Tobacco Use     Smoking status: Never     Smokeless tobacco: Never   Substance and Sexual Activity     Alcohol use: Yes     Comment: wine every day     Drug use: Never     Sexual activity: Not on file   Other  "Topics Concern     Parent/sibling w/ CABG, MI or angioplasty before 65F 55M? Not Asked   Social History Narrative     Not on file     Social Drivers of Health     Financial Resource Strain: Not on file   Food Insecurity: Not on file   Transportation Needs: Not on file   Physical Activity: Not on file   Stress: Not on file   Social Connections: Not on file   Interpersonal Safety: Unknown (2/18/2024)    Received from [facility]    Domestic Violence Screen      Physical Abuse Risk: Unknown      Verbal Abuse Risk: Unknown   Housing Stability: Not on file            Past Medical History:     Past Medical History:   Diagnosis Date     Hypertension               Past Surgical History:     Past Surgical History:   Procedure Laterality Date     COLONOSCOPY N/A 12/8/2015    Procedure: COLONOSCOPY;  Surgeon: Miles Banerjee MD;  Location:  GI     TONSILLECTOMY                Allergies:   Sulfa antibiotics and Seasonal allergies       Data:   All laboratory data reviewed:    Recent Labs   Lab Test 07/03/21  0815   TRIG 107       No results found for: \"WBC\", \"RBC\", \"HGB\", \"HCT\", \"MCV\", \"MCH\", \"MCHC\", \"RDW\", \"PLT\"    Lab Results   Component Value Date     05/29/2025    POTASSIUM 4.5 05/29/2025    POTASSIUM 4.6 12/29/2006    CHLORIDE 105 05/29/2025    CHLORIDE 94 (L) 08/24/2023    CO2 26 05/29/2025    ANIONGAP 9 05/29/2025    GLC 98 05/29/2025     @ 07/01/2006    BUN 16.9 05/29/2025    CR 0.9 07/03/2025    CR 0.78 05/29/2025    CR 0.90 12/29/2006    GFRESTIMATED >60 07/03/2025    GFRESTIMATED >90 12/29/2006    GFRESTBLACK  12/29/2006     >90  Stages of Chronic Kidney Disease  Stage 1:  GFR 90 or greater and other evidence of kidney damage*  Stage 2:  GFR 60-89 and other evidence of kidney damage *  Stage 3:  GFR 30-59  Stage 4:  GFR 15-29  Stage 5:  GFR less than 15 or dialysis  *Chronic kidney disease is defined as kidney damage or GFR less than 60   mL/min/1.73 m2 for three months or greater.  " "Kidney damage is defined as   pathologic abnormalities or markers or damage, including abnormalities in blood   or urine tests or imaging studies.    ARNULFO 9.7 05/29/2025      Lab Results   Component Value Date    AST 29 @ 07/01/2006    ALT 30 @ 07/01/2006       Lab Results   Component Value Date    A1C 5.0 @ 06/24/2005       No results found for: \"INR\"      ASHLEIGH LANDRY MD  [facility] [[email or web address]    Thank you for allowing me to participate in the care of your patient.      Sincerely,     ASHLEIGH LANDRY MD     [facility]  cc:   Seymour Reardon MD  [address]  [place] [address]      "